# Patient Record
Sex: FEMALE | Race: WHITE | Employment: UNEMPLOYED | URBAN - NONMETROPOLITAN AREA
[De-identification: names, ages, dates, MRNs, and addresses within clinical notes are randomized per-mention and may not be internally consistent; named-entity substitution may affect disease eponyms.]

---

## 2019-01-15 LAB
AVERAGE GLUCOSE: NORMAL
CHOLESTEROL, TOTAL: 188 MG/DL
CHOLESTEROL/HDL RATIO: 4.8
HBA1C MFR BLD: 5.4 %
HDLC SERPL-MCNC: 39 MG/DL (ref 35–70)
LDL CHOLESTEROL CALCULATED: 134 MG/DL (ref 0–160)
TRIGL SERPL-MCNC: 74 MG/DL
VLDLC SERPL CALC-MCNC: ABNORMAL MG/DL

## 2019-02-05 LAB
CREATININE: 0.6 MG/DL
POTASSIUM (K+): 3.6

## 2019-02-07 ENCOUNTER — OFFICE VISIT (OUTPATIENT)
Dept: CARDIOLOGY CLINIC | Age: 59
End: 2019-02-07
Payer: MEDICARE

## 2019-02-07 VITALS
BODY MASS INDEX: 41.62 KG/M2 | WEIGHT: 243.8 LBS | HEIGHT: 64 IN | HEART RATE: 86 BPM | DIASTOLIC BLOOD PRESSURE: 82 MMHG | SYSTOLIC BLOOD PRESSURE: 152 MMHG

## 2019-02-07 DIAGNOSIS — R00.2 INTERMITTENT PALPITATIONS: ICD-10-CM

## 2019-02-07 DIAGNOSIS — I10 ESSENTIAL HYPERTENSION: Primary | ICD-10-CM

## 2019-02-07 PROCEDURE — G8484 FLU IMMUNIZE NO ADMIN: HCPCS | Performed by: INTERNAL MEDICINE

## 2019-02-07 PROCEDURE — G8417 CALC BMI ABV UP PARAM F/U: HCPCS | Performed by: INTERNAL MEDICINE

## 2019-02-07 PROCEDURE — G8427 DOCREV CUR MEDS BY ELIG CLIN: HCPCS | Performed by: INTERNAL MEDICINE

## 2019-02-07 PROCEDURE — 1036F TOBACCO NON-USER: CPT | Performed by: INTERNAL MEDICINE

## 2019-02-07 PROCEDURE — 3017F COLORECTAL CA SCREEN DOC REV: CPT | Performed by: INTERNAL MEDICINE

## 2019-02-07 PROCEDURE — 99204 OFFICE O/P NEW MOD 45 MIN: CPT | Performed by: INTERNAL MEDICINE

## 2019-02-07 RX ORDER — PHENOL 1.4 %
1 AEROSOL, SPRAY (ML) MUCOUS MEMBRANE DAILY
COMMUNITY
End: 2019-02-22

## 2019-02-07 RX ORDER — M-VIT,TX,IRON,MINS/CALC/FOLIC 27MG-0.4MG
1 TABLET ORAL DAILY
COMMUNITY

## 2019-02-07 RX ORDER — HYDRALAZINE HYDROCHLORIDE 25 MG/1
25 TABLET, FILM COATED ORAL 3 TIMES DAILY PRN
COMMUNITY
End: 2019-07-16 | Stop reason: SDUPTHER

## 2019-02-07 RX ORDER — AMLODIPINE BESYLATE 10 MG/1
10 TABLET ORAL DAILY
COMMUNITY
End: 2019-03-27 | Stop reason: SDUPTHER

## 2019-02-07 RX ORDER — ACETAMINOPHEN 325 MG/1
650 TABLET ORAL EVERY 6 HOURS PRN
COMMUNITY

## 2019-02-15 ENCOUNTER — HOSPITAL ENCOUNTER (OUTPATIENT)
Dept: NON INVASIVE DIAGNOSTICS | Age: 59
Discharge: HOME | End: 2019-02-15
Payer: MEDICARE

## 2019-02-15 DIAGNOSIS — R00.2 INTERMITTENT PALPITATIONS: ICD-10-CM

## 2019-02-15 DIAGNOSIS — I10 ESSENTIAL HYPERTENSION: ICD-10-CM

## 2019-02-15 LAB
LV EF: 60 %
LVEF MODALITY: NORMAL

## 2019-02-15 PROCEDURE — 93306 TTE W/DOPPLER COMPLETE: CPT

## 2019-02-15 PROCEDURE — 93225 XTRNL ECG REC<48 HRS REC: CPT

## 2019-02-15 PROCEDURE — 93226 XTRNL ECG REC<48 HR SCAN A/R: CPT

## 2019-02-22 ENCOUNTER — OFFICE VISIT (OUTPATIENT)
Dept: FAMILY MEDICINE CLINIC | Age: 59
End: 2019-02-22
Payer: MEDICARE

## 2019-02-22 ENCOUNTER — TELEPHONE (OUTPATIENT)
Dept: FAMILY MEDICINE CLINIC | Age: 59
End: 2019-02-22

## 2019-02-22 ENCOUNTER — HOSPITAL ENCOUNTER (OUTPATIENT)
Age: 59
Setting detail: SPECIMEN
Discharge: HOME | End: 2019-02-22
Payer: MEDICARE

## 2019-02-22 VITALS
HEART RATE: 84 BPM | DIASTOLIC BLOOD PRESSURE: 78 MMHG | TEMPERATURE: 97.4 F | WEIGHT: 246.6 LBS | BODY MASS INDEX: 42.1 KG/M2 | RESPIRATION RATE: 16 BRPM | HEIGHT: 64 IN | SYSTOLIC BLOOD PRESSURE: 138 MMHG

## 2019-02-22 DIAGNOSIS — R35.0 INCREASED URINARY FREQUENCY: ICD-10-CM

## 2019-02-22 DIAGNOSIS — R73.01 ELEVATED FASTING GLUCOSE: ICD-10-CM

## 2019-02-22 DIAGNOSIS — Z86.79 HISTORY OF INTRACRANIAL HEMORRHAGE: ICD-10-CM

## 2019-02-22 DIAGNOSIS — E78.2 MIXED HYPERLIPIDEMIA: ICD-10-CM

## 2019-02-22 DIAGNOSIS — R82.71 BACTERIA IN URINE: Primary | ICD-10-CM

## 2019-02-22 DIAGNOSIS — N30.00 ACUTE CYSTITIS WITHOUT HEMATURIA: ICD-10-CM

## 2019-02-22 DIAGNOSIS — I10 ESSENTIAL HYPERTENSION: Primary | ICD-10-CM

## 2019-02-22 PROBLEM — I61.9 ICH (INTRACEREBRAL HEMORRHAGE) (HCC): Status: ACTIVE | Noted: 2019-01-15

## 2019-02-22 PROBLEM — E66.01 SEVERE OBESITY (BMI >= 40) (HCC): Status: ACTIVE | Noted: 2019-02-22

## 2019-02-22 LAB
-: ABNORMAL
AMORPHOUS: ABNORMAL
BACTERIA: ABNORMAL
BILIRUBIN URINE: NEGATIVE
CASTS UA: ABNORMAL /LPF (ref 0–2)
COLOR: ABNORMAL
COMMENT UA: ABNORMAL
CRYSTALS, UA: ABNORMAL /HPF
EPITHELIAL CELLS UA: ABNORMAL /HPF (ref 0–5)
GLUCOSE URINE: NEGATIVE
KETONES, URINE: NEGATIVE
LEUKOCYTE ESTERASE, URINE: NEGATIVE
MUCUS: ABNORMAL
NITRITE, URINE: NEGATIVE
OTHER OBSERVATIONS UA: ABNORMAL
PH UA: 7.5 (ref 5–6)
PROTEIN UA: NEGATIVE
RBC UA: ABNORMAL /HPF (ref 0–4)
RENAL EPITHELIAL, UA: ABNORMAL /HPF
SPECIFIC GRAVITY UA: 1.01 (ref 1.01–1.02)
TRICHOMONAS: ABNORMAL
TURBIDITY: ABNORMAL
URINE HGB: NEGATIVE
UROBILINOGEN, URINE: NORMAL
WBC UA: ABNORMAL /HPF (ref 0–4)
YEAST: ABNORMAL

## 2019-02-22 PROCEDURE — 1036F TOBACCO NON-USER: CPT | Performed by: FAMILY MEDICINE

## 2019-02-22 PROCEDURE — 99204 OFFICE O/P NEW MOD 45 MIN: CPT | Performed by: FAMILY MEDICINE

## 2019-02-22 PROCEDURE — 81001 URINALYSIS AUTO W/SCOPE: CPT

## 2019-02-22 PROCEDURE — 3017F COLORECTAL CA SCREEN DOC REV: CPT | Performed by: FAMILY MEDICINE

## 2019-02-22 PROCEDURE — G8417 CALC BMI ABV UP PARAM F/U: HCPCS | Performed by: FAMILY MEDICINE

## 2019-02-22 PROCEDURE — G8427 DOCREV CUR MEDS BY ELIG CLIN: HCPCS | Performed by: FAMILY MEDICINE

## 2019-02-22 PROCEDURE — G8484 FLU IMMUNIZE NO ADMIN: HCPCS | Performed by: FAMILY MEDICINE

## 2019-02-22 PROCEDURE — 87086 URINE CULTURE/COLONY COUNT: CPT

## 2019-02-22 RX ORDER — HYDRALAZINE HYDROCHLORIDE 25 MG/1
25 TABLET, FILM COATED ORAL
COMMUNITY
End: 2019-02-22

## 2019-02-22 RX ORDER — NITROFURANTOIN 25; 75 MG/1; MG/1
100 CAPSULE ORAL 2 TIMES DAILY
Qty: 10 CAPSULE | Refills: 0 | Status: SHIPPED | OUTPATIENT
Start: 2019-02-22 | End: 2019-02-27

## 2019-02-22 RX ORDER — ERGOCALCIFEROL 1.25 MG/1
CAPSULE ORAL
Refills: 0 | COMMUNITY
Start: 2019-02-01 | End: 2019-05-28

## 2019-02-22 ASSESSMENT — PATIENT HEALTH QUESTIONNAIRE - PHQ9
SUM OF ALL RESPONSES TO PHQ9 QUESTIONS 1 & 2: 0
2. FEELING DOWN, DEPRESSED OR HOPELESS: 0
SUM OF ALL RESPONSES TO PHQ QUESTIONS 1-9: 0
SUM OF ALL RESPONSES TO PHQ QUESTIONS 1-9: 0
1. LITTLE INTEREST OR PLEASURE IN DOING THINGS: 0

## 2019-02-24 LAB
CULTURE: NORMAL
Lab: NORMAL
SPECIMEN DESCRIPTION: NORMAL

## 2019-02-25 DIAGNOSIS — Z86.79 HISTORY OF INTRACRANIAL HEMORRHAGE: Primary | ICD-10-CM

## 2019-02-27 ENCOUNTER — HOSPITAL ENCOUNTER (OUTPATIENT)
Dept: MRI IMAGING | Age: 59
Discharge: HOME | End: 2019-03-01
Payer: MEDICARE

## 2019-02-27 DIAGNOSIS — I69.152: ICD-10-CM

## 2019-02-27 DIAGNOSIS — N39.0 RECURRENT UTI: Primary | ICD-10-CM

## 2019-02-27 PROCEDURE — 6360000004 HC RX CONTRAST MEDICATION: Performed by: PSYCHIATRY & NEUROLOGY

## 2019-02-27 PROCEDURE — A9579 GAD-BASE MR CONTRAST NOS,1ML: HCPCS | Performed by: PSYCHIATRY & NEUROLOGY

## 2019-02-27 PROCEDURE — 70553 MRI BRAIN STEM W/O & W/DYE: CPT

## 2019-02-27 RX ADMIN — GADOTERIDOL 15 ML: 279.3 INJECTION, SOLUTION INTRAVENOUS at 14:15

## 2019-03-01 LAB
ACQUISITION DURATION: NORMAL S
AVERAGE HEART RATE: 91 BPM
HOOKUP DATE: NORMAL
HOOKUP TIME: NORMAL
MAX HEART RATE TIME/DATE: NORMAL
MAX HEART RATE: 138 BPM
MIN HEART RATE TIME/DATE: NORMAL
MIN HEART RATE: 65 BPM
NUMBER OF QRS COMPLEXES: NORMAL
NUMBER OF SUPRAVENTRICULAR BEATS IN RUNS: 0
NUMBER OF SUPRAVENTRICULAR COUPLETS: 0
NUMBER OF SUPRAVENTRICULAR ECTOPICS: 14
NUMBER OF SUPRAVENTRICULAR ISOLATED BEATS: 14
NUMBER OF SUPRAVENTRICULAR RUNS: 0
NUMBER OF VENTRICULAR BEATS IN RUNS: 0
NUMBER OF VENTRICULAR BIGEMINAL CYCLES: 0
NUMBER OF VENTRICULAR COUPLETS: 0
NUMBER OF VENTRICULAR ECTOPICS: 13
NUMBER OF VENTRICULAR ISOLATED BEATS: 13
NUMBER OF VENTRICULAR RUNS: 0

## 2019-03-18 ENCOUNTER — OFFICE VISIT (OUTPATIENT)
Dept: CARDIOLOGY CLINIC | Age: 59
End: 2019-03-18
Payer: MEDICARE

## 2019-03-18 VITALS
HEIGHT: 64 IN | SYSTOLIC BLOOD PRESSURE: 148 MMHG | DIASTOLIC BLOOD PRESSURE: 76 MMHG | BODY MASS INDEX: 42.33 KG/M2 | HEART RATE: 65 BPM

## 2019-03-18 DIAGNOSIS — E78.2 MIXED HYPERLIPIDEMIA: ICD-10-CM

## 2019-03-18 DIAGNOSIS — I10 ESSENTIAL HYPERTENSION: Primary | ICD-10-CM

## 2019-03-18 DIAGNOSIS — R00.2 INTERMITTENT PALPITATIONS: ICD-10-CM

## 2019-03-18 PROCEDURE — G8484 FLU IMMUNIZE NO ADMIN: HCPCS | Performed by: INTERNAL MEDICINE

## 2019-03-18 PROCEDURE — 99214 OFFICE O/P EST MOD 30 MIN: CPT | Performed by: INTERNAL MEDICINE

## 2019-03-18 PROCEDURE — G8427 DOCREV CUR MEDS BY ELIG CLIN: HCPCS | Performed by: INTERNAL MEDICINE

## 2019-03-18 PROCEDURE — 3017F COLORECTAL CA SCREEN DOC REV: CPT | Performed by: INTERNAL MEDICINE

## 2019-03-18 PROCEDURE — 1036F TOBACCO NON-USER: CPT | Performed by: INTERNAL MEDICINE

## 2019-03-18 PROCEDURE — G8417 CALC BMI ABV UP PARAM F/U: HCPCS | Performed by: INTERNAL MEDICINE

## 2019-03-26 ENCOUNTER — TELEPHONE (OUTPATIENT)
Dept: FAMILY MEDICINE CLINIC | Age: 59
End: 2019-03-26
Payer: MEDICARE

## 2019-03-26 DIAGNOSIS — I69.110 ATTENTION AND CONCENTRATION DEFICIT FOLLOWING NONTRAUMATIC INTRACEREBRAL HEMORRHAGE: ICD-10-CM

## 2019-03-26 DIAGNOSIS — M62.81 MUSCLE WEAKNESS (GENERALIZED): ICD-10-CM

## 2019-03-26 DIAGNOSIS — I69.111 MEMORY DEFICIT FOLLOWING NONTRAUMATIC INTRACEREBRAL HEMORRHAGE: ICD-10-CM

## 2019-03-26 DIAGNOSIS — I69.198 OTHER SEQUELAE OF NONTRAUMATIC INTRACEREBRAL HEMORRHAGE: Primary | ICD-10-CM

## 2019-03-26 PROCEDURE — G0180 MD CERTIFICATION HHA PATIENT: HCPCS | Performed by: FAMILY MEDICINE

## 2019-03-27 ENCOUNTER — HOSPITAL ENCOUNTER (OUTPATIENT)
Dept: CT IMAGING | Age: 59
Discharge: HOME | End: 2019-03-29
Payer: MEDICARE

## 2019-03-27 ENCOUNTER — HOSPITAL ENCOUNTER (OUTPATIENT)
Dept: LAB | Age: 59
Discharge: HOME | End: 2019-03-27
Payer: MEDICARE

## 2019-03-27 ENCOUNTER — OFFICE VISIT (OUTPATIENT)
Dept: FAMILY MEDICINE CLINIC | Age: 59
End: 2019-03-27
Payer: MEDICARE

## 2019-03-27 ENCOUNTER — OFFICE VISIT (OUTPATIENT)
Dept: UROLOGY | Age: 59
End: 2019-03-27
Payer: MEDICARE

## 2019-03-27 VITALS
SYSTOLIC BLOOD PRESSURE: 138 MMHG | HEIGHT: 64 IN | DIASTOLIC BLOOD PRESSURE: 82 MMHG | BODY MASS INDEX: 42.17 KG/M2 | WEIGHT: 247 LBS | HEART RATE: 84 BPM

## 2019-03-27 VITALS
HEIGHT: 64 IN | SYSTOLIC BLOOD PRESSURE: 138 MMHG | DIASTOLIC BLOOD PRESSURE: 82 MMHG | BODY MASS INDEX: 42.27 KG/M2 | HEART RATE: 84 BPM | WEIGHT: 247.6 LBS | RESPIRATION RATE: 16 BRPM

## 2019-03-27 DIAGNOSIS — R10.32 LLQ ABDOMINAL PAIN: ICD-10-CM

## 2019-03-27 DIAGNOSIS — I69.198 OTHER SEQUELAE OF NONTRAUMATIC INTRACEREBRAL HEMORRHAGE: ICD-10-CM

## 2019-03-27 DIAGNOSIS — N32.81 OVERACTIVE BLADDER: Primary | ICD-10-CM

## 2019-03-27 DIAGNOSIS — R10.32 LLQ ABDOMINAL PAIN: Primary | ICD-10-CM

## 2019-03-27 DIAGNOSIS — R10.32 LLQ PAIN: Primary | ICD-10-CM

## 2019-03-27 DIAGNOSIS — N39.0 RECURRENT UTI: ICD-10-CM

## 2019-03-27 DIAGNOSIS — R39.15 URGENCY OF URINATION: ICD-10-CM

## 2019-03-27 DIAGNOSIS — Z23 NEED FOR TDAP VACCINATION: ICD-10-CM

## 2019-03-27 DIAGNOSIS — I10 ESSENTIAL HYPERTENSION: ICD-10-CM

## 2019-03-27 DIAGNOSIS — R35.1 NOCTURIA: ICD-10-CM

## 2019-03-27 DIAGNOSIS — Z12.31 ENCOUNTER FOR SCREENING MAMMOGRAM FOR BREAST CANCER: ICD-10-CM

## 2019-03-27 DIAGNOSIS — R35.0 URINARY FREQUENCY: ICD-10-CM

## 2019-03-27 LAB
ABSOLUTE EOS #: 0.2 K/UL (ref 0–0.4)
ABSOLUTE IMMATURE GRANULOCYTE: NORMAL K/UL (ref 0–0.3)
ABSOLUTE LYMPH #: 1.2 K/UL (ref 1–4.8)
ABSOLUTE MONO #: 0.4 K/UL (ref 0.1–1.2)
ALBUMIN SERPL-MCNC: 4 G/DL (ref 3.5–5.2)
ALBUMIN/GLOBULIN RATIO: 1.7 (ref 1–2.5)
ALP BLD-CCNC: 68 U/L (ref 35–104)
ALT SERPL-CCNC: 13 U/L (ref 5–33)
ANION GAP SERPL CALCULATED.3IONS-SCNC: 9 MMOL/L (ref 9–17)
AST SERPL-CCNC: 12 U/L
BASOPHILS # BLD: 0 % (ref 0–1)
BASOPHILS ABSOLUTE: 0 K/UL (ref 0–0.2)
BILIRUB SERPL-MCNC: 0.37 MG/DL (ref 0.3–1.2)
BUN BLDV-MCNC: 18 MG/DL (ref 6–20)
BUN/CREAT BLD: 35 (ref 9–20)
CALCIUM SERPL-MCNC: 8.9 MG/DL (ref 8.6–10.4)
CHLORIDE BLD-SCNC: 104 MMOL/L (ref 98–107)
CO2: 29 MMOL/L (ref 20–31)
CREAT SERPL-MCNC: 0.51 MG/DL (ref 0.5–0.9)
DIFFERENTIAL TYPE: NORMAL
EOSINOPHILS RELATIVE PERCENT: 4 % (ref 1–7)
GFR AFRICAN AMERICAN: >60 ML/MIN
GFR NON-AFRICAN AMERICAN: >60 ML/MIN
GFR SERPL CREATININE-BSD FRML MDRD: ABNORMAL ML/MIN/{1.73_M2}
GFR SERPL CREATININE-BSD FRML MDRD: ABNORMAL ML/MIN/{1.73_M2}
GLUCOSE BLD-MCNC: 102 MG/DL (ref 70–99)
HCT VFR BLD CALC: 39.5 % (ref 36–46)
HEMOGLOBIN: 12.9 G/DL (ref 12–16)
IMMATURE GRANULOCYTES: NORMAL %
LYMPHOCYTES # BLD: 28 % (ref 16–46)
MCH RBC QN AUTO: 29.3 PG (ref 26–34)
MCHC RBC AUTO-ENTMCNC: 32.8 G/DL (ref 31–37)
MCV RBC AUTO: 89.5 FL (ref 80–100)
MONOCYTES # BLD: 10 % (ref 4–11)
NRBC AUTOMATED: NORMAL PER 100 WBC
PDW BLD-RTO: 13.8 % (ref 11–14.5)
PLATELET # BLD: 193 K/UL (ref 140–450)
PLATELET ESTIMATE: NORMAL
PMV BLD AUTO: 8.3 FL (ref 6–12)
POTASSIUM SERPL-SCNC: 3.8 MMOL/L (ref 3.7–5.3)
RBC # BLD: 4.41 M/UL (ref 4–5.2)
RBC # BLD: NORMAL 10*6/UL
SEG NEUTROPHILS: 58 % (ref 43–77)
SEGMENTED NEUTROPHILS ABSOLUTE COUNT: 2.5 K/UL (ref 1.8–7.7)
SODIUM BLD-SCNC: 142 MMOL/L (ref 135–144)
TOTAL PROTEIN: 6.4 G/DL (ref 6.4–8.3)
WBC # BLD: 4.4 K/UL (ref 3.5–11)
WBC # BLD: NORMAL 10*3/UL

## 2019-03-27 PROCEDURE — G8427 DOCREV CUR MEDS BY ELIG CLIN: HCPCS | Performed by: UROLOGY

## 2019-03-27 PROCEDURE — G8484 FLU IMMUNIZE NO ADMIN: HCPCS | Performed by: FAMILY MEDICINE

## 2019-03-27 PROCEDURE — 1036F TOBACCO NON-USER: CPT | Performed by: FAMILY MEDICINE

## 2019-03-27 PROCEDURE — 85025 COMPLETE CBC W/AUTO DIFF WBC: CPT

## 2019-03-27 PROCEDURE — 99204 OFFICE O/P NEW MOD 45 MIN: CPT | Performed by: UROLOGY

## 2019-03-27 PROCEDURE — G8484 FLU IMMUNIZE NO ADMIN: HCPCS | Performed by: UROLOGY

## 2019-03-27 PROCEDURE — 80053 COMPREHEN METABOLIC PANEL: CPT

## 2019-03-27 PROCEDURE — 74177 CT ABD & PELVIS W/CONTRAST: CPT

## 2019-03-27 PROCEDURE — 3017F COLORECTAL CA SCREEN DOC REV: CPT | Performed by: UROLOGY

## 2019-03-27 PROCEDURE — 6360000004 HC RX CONTRAST MEDICATION: Performed by: FAMILY MEDICINE

## 2019-03-27 PROCEDURE — G8427 DOCREV CUR MEDS BY ELIG CLIN: HCPCS | Performed by: FAMILY MEDICINE

## 2019-03-27 PROCEDURE — 99214 OFFICE O/P EST MOD 30 MIN: CPT | Performed by: FAMILY MEDICINE

## 2019-03-27 PROCEDURE — G8417 CALC BMI ABV UP PARAM F/U: HCPCS | Performed by: UROLOGY

## 2019-03-27 PROCEDURE — 36415 COLL VENOUS BLD VENIPUNCTURE: CPT

## 2019-03-27 PROCEDURE — G8417 CALC BMI ABV UP PARAM F/U: HCPCS | Performed by: FAMILY MEDICINE

## 2019-03-27 PROCEDURE — 1036F TOBACCO NON-USER: CPT | Performed by: UROLOGY

## 2019-03-27 PROCEDURE — 3017F COLORECTAL CA SCREEN DOC REV: CPT | Performed by: FAMILY MEDICINE

## 2019-03-27 RX ORDER — AMLODIPINE BESYLATE 10 MG/1
10 TABLET ORAL DAILY
Qty: 30 TABLET | Refills: 5 | Status: SHIPPED | OUTPATIENT
Start: 2019-03-27 | End: 2019-05-28 | Stop reason: SDUPTHER

## 2019-03-27 RX ORDER — OXYBUTYNIN CHLORIDE 10 MG/1
10 TABLET, EXTENDED RELEASE ORAL DAILY
Qty: 30 TABLET | Refills: 3 | Status: SHIPPED | OUTPATIENT
Start: 2019-03-27 | End: 2019-05-28

## 2019-03-27 RX ADMIN — IOHEXOL 50 ML: 240 INJECTION, SOLUTION INTRATHECAL; INTRAVASCULAR; INTRAVENOUS; ORAL at 14:55

## 2019-03-27 RX ADMIN — IOPAMIDOL 100 ML: 755 INJECTION, SOLUTION INTRAVENOUS at 14:55

## 2019-03-27 ASSESSMENT — PATIENT HEALTH QUESTIONNAIRE - PHQ9
SUM OF ALL RESPONSES TO PHQ9 QUESTIONS 1 & 2: 2
2. FEELING DOWN, DEPRESSED OR HOPELESS: 1
1. LITTLE INTEREST OR PLEASURE IN DOING THINGS: 1
SUM OF ALL RESPONSES TO PHQ QUESTIONS 1-9: 2
SUM OF ALL RESPONSES TO PHQ QUESTIONS 1-9: 2

## 2019-03-28 LAB
CHOLESTEROL, TOTAL: 152 MG/DL
CHOLESTEROL/HDL RATIO: 4.11
HDLC SERPL-MCNC: 37 MG/DL (ref 35–70)
LDL CHOLESTEROL CALCULATED: 94.8 MG/DL (ref 0–160)
TRIGL SERPL-MCNC: 101 MG/DL
VLDLC SERPL CALC-MCNC: NORMAL MG/DL

## 2019-04-02 ENCOUNTER — HOSPITAL ENCOUNTER (OUTPATIENT)
Age: 59
Setting detail: SPECIMEN
Discharge: HOME | End: 2019-04-02
Payer: MEDICARE

## 2019-04-02 PROCEDURE — 87506 IADNA-DNA/RNA PROBE TQ 6-11: CPT

## 2019-04-03 ENCOUNTER — TELEPHONE (OUTPATIENT)
Dept: FAMILY MEDICINE CLINIC | Age: 59
End: 2019-04-03

## 2019-04-03 DIAGNOSIS — R10.32 LLQ PAIN: ICD-10-CM

## 2019-04-03 DIAGNOSIS — E78.2 MIXED HYPERLIPIDEMIA: Primary | ICD-10-CM

## 2019-04-03 LAB
CAMPYLOBACTER PCR: NORMAL
E COLI ENTEROTOXIGENIC PCR: NORMAL
PLESIOMONAS SHIGELLOIDES PCR: NORMAL
SALMONELLA PCR: NORMAL
SHIGATOXIN GENE PCR: NORMAL
SHIGELLA SP PCR: NORMAL
SPECIMEN DESCRIPTION: NORMAL
VIBRIO PCR: NORMAL
YERSINIA ENTEROCOLITICA PCR: NORMAL

## 2019-04-03 RX ORDER — ATORVASTATIN CALCIUM 20 MG/1
20 TABLET, FILM COATED ORAL DAILY
Qty: 30 TABLET | Refills: 5 | Status: SHIPPED | OUTPATIENT
Start: 2019-04-03 | End: 2019-05-28

## 2019-04-04 ENCOUNTER — HOSPITAL ENCOUNTER (OUTPATIENT)
Dept: MAMMOGRAPHY | Age: 59
Discharge: HOME | End: 2019-04-06
Payer: MEDICARE

## 2019-04-04 DIAGNOSIS — Z12.31 ENCOUNTER FOR SCREENING MAMMOGRAM FOR BREAST CANCER: ICD-10-CM

## 2019-04-04 PROCEDURE — 77063 BREAST TOMOSYNTHESIS BI: CPT

## 2019-04-05 ENCOUNTER — TELEPHONE (OUTPATIENT)
Dept: FAMILY MEDICINE CLINIC | Age: 59
End: 2019-04-05

## 2019-04-10 NOTE — TELEPHONE ENCOUNTER
[facility] PT calling back stating pt's appt is Friday so they would really like this taken care of before Friday.

## 2019-04-11 NOTE — TELEPHONE ENCOUNTER
Writer spoke with [facility] PT,they request our office to contact dVisit to prior auth for out of network-PT,OT and speech. They will fax a form to complete. dVisit was [employer] with Pati THOMPSON # [id number].

## 2019-04-16 NOTE — TELEPHONE ENCOUNTER
Herve from the prior [facility] [employer] dept calling back stating she needs to clarify all things ordered with diagnosis codes even though we are completing a form, Mirela Pink states the form is coming from a different dept and we need to call and speak to prior [address] at [phone] ext [phone]

## 2019-04-17 NOTE — TELEPHONE ENCOUNTER
Writer spoke with [facility]. Patient's medical coverage is refusing to cover services at their facility. The patient is going to apply for financial assistance so she can receive therapy there.

## 2019-05-28 ENCOUNTER — OFFICE VISIT (OUTPATIENT)
Dept: FAMILY MEDICINE CLINIC | Age: 59
End: 2019-05-28
Payer: MEDICARE

## 2019-05-28 VITALS
RESPIRATION RATE: 16 BRPM | BODY MASS INDEX: 42.14 KG/M2 | DIASTOLIC BLOOD PRESSURE: 76 MMHG | WEIGHT: 246.8 LBS | HEIGHT: 64 IN | SYSTOLIC BLOOD PRESSURE: 138 MMHG | HEART RATE: 68 BPM

## 2019-05-28 DIAGNOSIS — L29.9 PRURITUS: ICD-10-CM

## 2019-05-28 DIAGNOSIS — M25.552 LEFT HIP PAIN: ICD-10-CM

## 2019-05-28 DIAGNOSIS — R60.0 LOWER EXTREMITY EDEMA: ICD-10-CM

## 2019-05-28 DIAGNOSIS — I69.198 OTHER SEQUELAE OF NONTRAUMATIC INTRACEREBRAL HEMORRHAGE: Primary | ICD-10-CM

## 2019-05-28 DIAGNOSIS — I10 ESSENTIAL HYPERTENSION: ICD-10-CM

## 2019-05-28 DIAGNOSIS — L98.9 SKIN LESION OF NECK: ICD-10-CM

## 2019-05-28 PROCEDURE — 3017F COLORECTAL CA SCREEN DOC REV: CPT | Performed by: FAMILY MEDICINE

## 2019-05-28 PROCEDURE — G8427 DOCREV CUR MEDS BY ELIG CLIN: HCPCS | Performed by: FAMILY MEDICINE

## 2019-05-28 PROCEDURE — G8417 CALC BMI ABV UP PARAM F/U: HCPCS | Performed by: FAMILY MEDICINE

## 2019-05-28 PROCEDURE — 99214 OFFICE O/P EST MOD 30 MIN: CPT | Performed by: FAMILY MEDICINE

## 2019-05-28 PROCEDURE — 1036F TOBACCO NON-USER: CPT | Performed by: FAMILY MEDICINE

## 2019-05-28 RX ORDER — METOPROLOL SUCCINATE 25 MG/1
25 TABLET, EXTENDED RELEASE ORAL DAILY
Qty: 30 TABLET | Refills: 3 | Status: SHIPPED | OUTPATIENT
Start: 2019-05-28 | End: 2019-05-28 | Stop reason: SDUPTHER

## 2019-05-28 RX ORDER — TRIAMCINOLONE ACETONIDE 5 MG/G
CREAM TOPICAL
Qty: 1 TUBE | Refills: 0 | Status: SHIPPED | OUTPATIENT
Start: 2019-05-28

## 2019-05-28 RX ORDER — AMLODIPINE BESYLATE 5 MG/1
5 TABLET ORAL DAILY
Qty: 90 TABLET | Refills: 0 | Status: SHIPPED | OUTPATIENT
Start: 2019-05-28 | End: 2019-06-05 | Stop reason: ALTCHOICE

## 2019-05-28 RX ORDER — METOPROLOL SUCCINATE 25 MG/1
25 TABLET, EXTENDED RELEASE ORAL DAILY
Qty: 90 TABLET | Refills: 0 | Status: SHIPPED | OUTPATIENT
Start: 2019-05-28 | End: 2019-07-16 | Stop reason: SDUPTHER

## 2019-05-28 RX ORDER — AMLODIPINE BESYLATE 10 MG/1
5 TABLET ORAL DAILY
Qty: 30 TABLET | Refills: 5 | Status: SHIPPED | OUTPATIENT
Start: 2019-05-28 | End: 2019-05-28 | Stop reason: SDUPTHER

## 2019-05-28 NOTE — PROGRESS NOTES
[address], [address]                        Telephone [phone]             Fax [phone]     Maxi Noel  1960  MRN:  [id number]  Date of visit:  5/28/2019    Subjective:    Maxi Noel is a 60 y.o.  female who presents to [facility] today (5/28/2019) for follow up/evaluation of: Other (patient wanting to start driving again-requesting to have referral for OT driving ability/  evaluation); Edema (bilateral lower leg/ankle,feet swelling started 4 days ago); Insect Bite (woke up with raised area on the left side of her jaw,itches); and Other (patient wanting to file for disability-discuss paperwork)      She has multiple concerns today. She had a hemorrhagic stroke in January 2019. She would like to begin driving again. She is also concerned about swelling in her feet and ankles bilaterally. She also has an raised area on the left side of her jaw. She thinks she may have been bitten by an insect while she was sleeping. She also reports left hip pain. She denies injury. She also reports generalized pruritus. She also would like disability paperwork completed.        She has the following problem list:  Patient Active Problem List   Diagnosis    Essential hypertension    Intermittent palpitations with tachycardia of max of 119 bpm  on exertion    History of intracranial hemorrhage    Family history of breast cancer    Mixed hyperlipidemia    Obstructive sleep apnea on CPAP    Severe obesity (BMI >= 40) (HCC)    Elevated fasting glucose        Current medications are:  Outpatient Medications Marked as Taking for the 5/28/19 encounter (Office Visit) with Teodoro Larson MD   Medication Sig Dispense Refill    metoprolol tartrate (LOPRESSOR) 12.5 mg TABS Take 12.5 mg by mouth 2 times daily      Cranberry 500 MG CHEW Take by mouth daily      amLODIPine (NORVASC) 10 MG tablet Take 1 tablet by mouth daily Indications: hold if bp is < 120/70 30 tablet 5    hydrALAZINE (APRESOLINE) 25 MG tablet Take 25 mg by mouth 3 times daily as needed Take if SBP> 140      aspirin 81 MG tablet Take 81 mg by mouth daily      Multiple Vitamins-Minerals (THERAPEUTIC MULTIVITAMIN-MINERALS) tablet Take 1 tablet by mouth daily      acetaminophen (TYLENOL) 325 MG tablet Take 650 mg by mouth every 6 hours as needed for Pain         She is allergic to latex; amoxicillin; ciprofloxacin; codeine; demerol hcl [meperidine]; docusate; metoprolol succinate [metoprolol]; norvasc [amlodipine]; omeprazole; prednisone; propofol; senna; sulfa antibiotics; tomato; ultram [tramadol hcl]; lisinopril; losartan; and z-waleska [azithromycin]. She is not currently a smoker. She  reports that she quit smoking about 34 years ago. Her smoking use included cigarettes. She started smoking about 42 years ago. She has never used smokeless tobacco.      Objective:    Vitals:    05/28/19 1211   BP: 138/76   Site: Right Upper Arm   Position: Sitting   Cuff Size: Large Adult   Pulse: 68   Resp: 16   Weight: 246 lb 12.8 oz (111.9 kg)   Height: 5' 4\" (1.626 m)     Body mass index is 42.36 kg/m². Extremely obese  female, healthy-appearing, alert, cooperative and in no distress. There is a mildly tender erythematous mass on the left side of the neck. Chest:  Normal expansion. Clear to auscultation. No rales, rhonchi, or wheezing. Heart sounds are normal.  Regular rate and rhythm without murmur, gallop or rub. Left hip has mild tenderness to palpation laterally. Lower extremities have trace to 1+ edema. She has had no recent labs. Assessment and Plan:    1.  Other sequelae of nontraumatic intracerebral hemorrhage  I have recommended occupational therapy evaluation to determine if she should be driving:  - External Referral To Occupational Therapy    I have asked her to sign a record release for her previous medical records prior to completing the disability paperwork. 2. Essential hypertension  Her blood pressure is adequately-controlled. (BP: 138/76)   She was advised to continue current medications. Toprol XL and Amlodipine were refilled:   - metoprolol succinate (TOPROL XL) 25 MG extended release tablet; Take 1 tablet by mouth daily  Dispense: 90 tablet; Refill: 0  - amLODIPine (NORVASC) 5 MG tablet; Take 1 tablet by mouth daily Indications: hold if bp is < 120/70  Dispense: 90 tablet; Refill: 0    3. Left hip pain  I have ordered x-rays to be done today:  - XR HIP LEFT (2-3 VIEWS); Future    She was also referred to orthopedic surgery:  - [facility] Orthopedic Surgery    4. Lower extremity edema  She had been seeing a cardiologist in [facility]. She prefers to see cardiology here. A referral was ordered:  - External Referral To Cardiology    5. Skin lesion of neck  Probable insect bite. Aristocort cream was prescribed:  - triamcinolone (ARISTOCORT) 0.5 % cream; Apply topically 3 times daily. Dispense: 1 Tube; Refill: 0    6. Pruritus  - [facility] Dermatology    7. Routine health maintenance  Health maintenance was reviewed with the patient. Colonoscopy was recommended. She declined. Hepatitis C and HIV screenings were recommended and declined. Tdap and Shingrix were recommended and declined. I inadvertently did not discuss cervical cancer screening with the patient at today's visit. I will discuss this with her at her next office visit. There is no indication for Pneumovax or Prevnar-13 at this time.      (Please note that portions of this note were completed with a voice-recognition program. Efforts were made to edit the dictation but occasionally words are mis-transcribed.)

## 2019-06-03 ENCOUNTER — TELEPHONE (OUTPATIENT)
Dept: FAMILY MEDICINE CLINIC | Age: 59
End: 2019-06-03

## 2019-06-03 NOTE — TELEPHONE ENCOUNTER
Pt calling stating that OT has released her to get her license back and pt questions what she is to do next in this process, please advise at above number. Pt aware RR out of office today.

## 2019-06-05 ENCOUNTER — OFFICE VISIT (OUTPATIENT)
Dept: CARDIOLOGY CLINIC | Age: 59
End: 2019-06-05
Payer: MEDICARE

## 2019-06-05 VITALS
HEIGHT: 64 IN | DIASTOLIC BLOOD PRESSURE: 90 MMHG | WEIGHT: 243 LBS | BODY MASS INDEX: 41.48 KG/M2 | SYSTOLIC BLOOD PRESSURE: 152 MMHG | HEART RATE: 70 BPM

## 2019-06-05 DIAGNOSIS — I10 ESSENTIAL HYPERTENSION: Primary | ICD-10-CM

## 2019-06-05 DIAGNOSIS — R06.09 DOE (DYSPNEA ON EXERTION): ICD-10-CM

## 2019-06-05 DIAGNOSIS — E78.2 MIXED HYPERLIPIDEMIA: ICD-10-CM

## 2019-06-05 PROCEDURE — 3017F COLORECTAL CA SCREEN DOC REV: CPT | Performed by: PHYSICIAN ASSISTANT

## 2019-06-05 PROCEDURE — G8427 DOCREV CUR MEDS BY ELIG CLIN: HCPCS | Performed by: PHYSICIAN ASSISTANT

## 2019-06-05 PROCEDURE — 93000 ELECTROCARDIOGRAM COMPLETE: CPT | Performed by: PHYSICIAN ASSISTANT

## 2019-06-05 PROCEDURE — 1036F TOBACCO NON-USER: CPT | Performed by: PHYSICIAN ASSISTANT

## 2019-06-05 PROCEDURE — G8417 CALC BMI ABV UP PARAM F/U: HCPCS | Performed by: PHYSICIAN ASSISTANT

## 2019-06-05 PROCEDURE — 99203 OFFICE O/P NEW LOW 30 MIN: CPT | Performed by: PHYSICIAN ASSISTANT

## 2019-06-05 RX ORDER — HYDROCHLOROTHIAZIDE 12.5 MG/1
12.5 TABLET ORAL DAILY
Qty: 90 TABLET | Refills: 3 | Status: SHIPPED
Start: 2019-06-05 | End: 2019-07-16 | Stop reason: SINTOL

## 2019-06-05 RX ORDER — AMLODIPINE BESYLATE 5 MG/1
5 TABLET ORAL DAILY
COMMUNITY
End: 2019-07-16 | Stop reason: SDUPTHER

## 2019-06-05 RX ORDER — HYDROCODONE BITARTRATE AND ACETAMINOPHEN 5; 325 MG/1; MG/1
1 TABLET ORAL
COMMUNITY
Start: 2017-11-15

## 2019-06-05 NOTE — PROGRESS NOTES
[facility] PROFESSIONAL SERVICES  [facility] [address]   [address] [address]   Dept: [phone]   Dept Fax: [phone]   Loc: [phone]      Chief Complaint   Patient presents with   Elverna Whitley    Hypertension     Patient with complaint of increased lower extremity edema and shortness of breath. Patient recently had an echo which revealed a preserved LV function, grade 1 diastolic dysfunction. She had a Holter monitor which did not reveal any significant arrhythmias. She has a strong family history with both parents undergoing PCI with her mother having it done in her 46s. Patient has hypertension and hyperlipidemia. Blood pressure still runs high at times. Her Norvasc was decreased from 10-5 mg due to the fact that there was some concerns that may be causing the edema.         General:   No fever, no chills, No fatigue or weight loss  Pulmonary:   Dyspnea on exertion  Cardiac:    Denies recent chest pain   GI:     No nausea or vomiting, no abdominal pain  Neuro:    No dizziness or light headedness  Musculoskeletal:  No recent active issues  Extremities:   +edema, good peripheral pulses      Past Medical History:   Diagnosis Date    Arrhythmia     Chronic pain     Edema     GERD (gastroesophageal reflux disease)     Hypertension     [facility]brum[facility] ([facility] 2019    Urticaria        Allergies   Allergen Reactions    Latex Rash    Amoxicillin     Ciprofloxacin     Codeine     Demerol Hcl [Meperidine]     Docusate Hives    Metoprolol Succinate [Metoprolol]     Norvasc [Amlodipine]     Omeprazole     Prednisone     Propofol     Senna     Sulfa Antibiotics     Tomato     Ultram [Tramadol Hcl]     Lisinopril Rash    Losartan Rash    Z-Randall [Azithromycin] Anxiety       Current Outpatient Medications   Medication Sig Dispense Refill    NONFORMULARY Apple cider vinegar 1-2 tabs daily      HYDROcodone-acetaminophen (NORCO) 5-325 MG per tablet Relationship status: None    Intimate partner violence:     Fear of current or ex partner: None     Emotionally abused: None     Physically abused: None     Forced sexual activity: None   Other Topics Concern    None   Social History Narrative    None       Family History   Problem Relation Age of Onset    High Blood Pressure Mother     Arthritis Sister     Cancer Brother         colon cancer    Other Daughter         bipolar disorder       Blood pressure (!) 152/90, pulse 70, height 5' 4\" (1.626 m), weight 243 lb (110.2 kg). General:   Well developed, well nourished  Lungs:   Clear to auscultation  Heart:    Normal S1 S2, No murmur, rubs, or gallops  Abdomen:   Soft, non tender, no organomegalies, positive bowel sounds  Extremities:   No edema, no cyanosis, good peripheral pulses  Neurological:   Awake, alert, oriented. No obvious focal deficits  Musculoskeletal:  No obvious deformities    EKG:  Normal sinus rhythm    Holter monitor 2/15/2019  CONCLUSION:  *    Normal Sinus rhythm   No long pause or profound bradycardia   No supraventricular tachycardia   No Atrial Fibrillation    Echo 2/15/2019  Conclusions      Summary   Normal left ventricle size and systolic function. Ejection fraction was   estimated at 60 %. There were no regional left ventricular wall motion   abnormalities and wall thickness was within normal limits.   Doppler parameters were consistent with abnormal left ventricular   relaxation (grade 1 diastolic dysfunction).   The left atrium is Mildly dilated. Diagnosis Orders   1. Essential hypertension  EKG 12 Lead    Stress test (Lexiscan)   2. CHAPMAN (dyspnea on exertion)  Stress test (Lexiscan)   3.  Mixed hyperlipidemia         Orders Placed This Encounter   Procedures    Stress test Zayra Reilly)     Standing Status:   Future     Standing Expiration Date:   6/5/2020     Order Specific Question:   Reason for Exam?     Answer:   Shortness of breath     Order Specific Question:   Does patient have left bundle branch block (LBBB) or left ventricular hypertrophy (LVH) based on resting ECG, a ventricular pacemaker, or is unable to exercise on a treadmill based on physical or mental limitations? Answer: Yes    EKG 12 Lead     Order Specific Question:   Reason for Exam?     Answer: Other         I will add hydrochlorothiazide 12.5 mg daily. Would like her to monitor her blood pressure closely. 2 g sodium diet and 2 L fluid restriction. We will do a Lexiscan stress test.    Continue all other current medications and with constant vigilance to changes in symptoms and also any potential side effects. Return for care if become worse or seek medical attention immediately. The patient is educated on symptoms of heart disease that include chest pain and passing out, dizziness, etc and to report them if there is any change or go to emergency room.        Follow up with Myself in 6-8 weeks or sooner if needed  (Please note that portions of this note were completed with a voice recognition program.  Efforts were made to edit the dictation but occasionally words are mis-transcribed.)      Yesenia Cooper PA-C

## 2019-06-07 ENCOUNTER — TELEPHONE (OUTPATIENT)
Dept: PRIMARY CARE CLINIC | Age: 59
End: 2019-06-07

## 2019-06-07 NOTE — TELEPHONE ENCOUNTER
Pt's daughter Crystal Adjutant called [phone]. Dr. Jessa Wheat pt-    Pt has hx of recent CVA. Amlodipine was decreased to 5 mg daily from 10 mg due to edema and HCTZ was added. She has hydralazine 25 mg tid prn which she has been taking more often. Took one at 3 PM.  BP at 4 PM is still 144/105. Pt's daughter wants her to go back to amlodipine 10 mg daily due to that is what she had been taking for a long time.

## 2019-06-07 NOTE — TELEPHONE ENCOUNTER
I spoke to Mehul. She is aware of Kim's recommendation and is unsure when she can bring her mom, who can't drive. They will try to bring her tonight or tomorrow. She knows the hydralazine says tidoreen prn, but last time they gave her three in a day, BP \"bottomed out. \"

## 2019-06-27 ENCOUNTER — HOSPITAL ENCOUNTER (OUTPATIENT)
Dept: NON INVASIVE DIAGNOSTICS | Age: 59
Discharge: HOME | End: 2019-06-27
Payer: MEDICARE

## 2019-06-27 VITALS — HEIGHT: 64 IN | BODY MASS INDEX: 40.97 KG/M2 | WEIGHT: 240 LBS

## 2019-06-27 DIAGNOSIS — I10 ESSENTIAL HYPERTENSION: ICD-10-CM

## 2019-06-27 DIAGNOSIS — R06.09 DOE (DYSPNEA ON EXERTION): ICD-10-CM

## 2019-06-27 PROCEDURE — 2709999900 HC NON-CHARGEABLE SUPPLY

## 2019-06-27 PROCEDURE — A9500 TC99M SESTAMIBI: HCPCS | Performed by: INTERNAL MEDICINE

## 2019-06-27 PROCEDURE — 3430000000 HC RX DIAGNOSTIC RADIOPHARMACEUTICAL: Performed by: INTERNAL MEDICINE

## 2019-06-27 PROCEDURE — 93017 CV STRESS TEST TRACING ONLY: CPT

## 2019-06-27 PROCEDURE — 6360000002 HC RX W HCPCS

## 2019-06-27 PROCEDURE — 78452 HT MUSCLE IMAGE SPECT MULT: CPT

## 2019-06-27 PROCEDURE — 93016 CV STRESS TEST SUPVJ ONLY: CPT | Performed by: INTERNAL MEDICINE

## 2019-06-27 PROCEDURE — 93018 CV STRESS TEST I&R ONLY: CPT | Performed by: INTERNAL MEDICINE

## 2019-06-27 PROCEDURE — 78452 HT MUSCLE IMAGE SPECT MULT: CPT | Performed by: INTERNAL MEDICINE

## 2019-06-27 RX ADMIN — Medication 34.9 MILLICURIE: at 12:20

## 2019-06-27 RX ADMIN — Medication 10.1 MILLICURIE: at 11:16

## 2019-06-28 ENCOUNTER — TELEPHONE (OUTPATIENT)
Dept: FAMILY MEDICINE CLINIC | Age: 59
End: 2019-06-28

## 2019-06-28 NOTE — TELEPHONE ENCOUNTER
Patient called in, was very confusing, stating I needed to tell her right now what is going on with her Drivers License. I informed her I did not know what she ment, she states that she needs a letter stating she can drive.

## 2019-06-28 NOTE — TELEPHONE ENCOUNTER
Spoke to patient and patient states therapy told her they sent driving recommendation paperwork to our office. No paperwork was received. Call made to [facility] therapy to refax paper.

## 2019-07-16 ENCOUNTER — OFFICE VISIT (OUTPATIENT)
Dept: CARDIOLOGY CLINIC | Age: 59
End: 2019-07-16
Payer: MEDICARE

## 2019-07-16 VITALS
DIASTOLIC BLOOD PRESSURE: 98 MMHG | WEIGHT: 242 LBS | HEIGHT: 64 IN | SYSTOLIC BLOOD PRESSURE: 174 MMHG | BODY MASS INDEX: 41.32 KG/M2 | HEART RATE: 83 BPM

## 2019-07-16 DIAGNOSIS — E66.3 OVER WEIGHT: Primary | ICD-10-CM

## 2019-07-16 DIAGNOSIS — I10 ESSENTIAL HYPERTENSION: ICD-10-CM

## 2019-07-16 PROCEDURE — G8427 DOCREV CUR MEDS BY ELIG CLIN: HCPCS | Performed by: PHYSICIAN ASSISTANT

## 2019-07-16 PROCEDURE — 1036F TOBACCO NON-USER: CPT | Performed by: PHYSICIAN ASSISTANT

## 2019-07-16 PROCEDURE — 3017F COLORECTAL CA SCREEN DOC REV: CPT | Performed by: PHYSICIAN ASSISTANT

## 2019-07-16 PROCEDURE — G8417 CALC BMI ABV UP PARAM F/U: HCPCS | Performed by: PHYSICIAN ASSISTANT

## 2019-07-16 PROCEDURE — 99213 OFFICE O/P EST LOW 20 MIN: CPT | Performed by: PHYSICIAN ASSISTANT

## 2019-07-16 RX ORDER — AMLODIPINE BESYLATE 5 MG/1
5 TABLET ORAL DAILY
Qty: 90 TABLET | Refills: 1 | Status: CANCELLED | OUTPATIENT
Start: 2019-07-16

## 2019-07-16 RX ORDER — METOPROLOL SUCCINATE 25 MG/1
25 TABLET, EXTENDED RELEASE ORAL DAILY
Qty: 90 TABLET | Refills: 0 | Status: CANCELLED | OUTPATIENT
Start: 2019-07-16

## 2019-07-16 RX ORDER — HYDRALAZINE HYDROCHLORIDE 25 MG/1
25 TABLET, FILM COATED ORAL 3 TIMES DAILY PRN
Qty: 90 TABLET | Refills: 1 | Status: CANCELLED | OUTPATIENT
Start: 2019-07-16

## 2019-07-16 RX ORDER — AMLODIPINE BESYLATE 5 MG/1
5 TABLET ORAL DAILY
Qty: 90 TABLET | Refills: 1 | Status: SHIPPED | OUTPATIENT
Start: 2019-07-16

## 2019-07-16 RX ORDER — METOPROLOL SUCCINATE 25 MG/1
25 TABLET, EXTENDED RELEASE ORAL DAILY
Qty: 90 TABLET | Refills: 1 | Status: SHIPPED | OUTPATIENT
Start: 2019-07-16 | End: 2019-08-22

## 2019-07-16 RX ORDER — HYDRALAZINE HYDROCHLORIDE 25 MG/1
25 TABLET, FILM COATED ORAL 3 TIMES DAILY PRN
Qty: 90 TABLET | Refills: 1 | Status: SHIPPED | OUTPATIENT
Start: 2019-07-16

## 2019-08-12 ENCOUNTER — TELEPHONE (OUTPATIENT)
Dept: INTERNAL MEDICINE CLINIC | Age: 59
End: 2019-08-12

## 2019-08-14 ENCOUNTER — TELEPHONE (OUTPATIENT)
Dept: INTERNAL MEDICINE CLINIC | Age: 59
End: 2019-08-14

## 2019-08-22 ENCOUNTER — TELEPHONE (OUTPATIENT)
Dept: CARDIOLOGY CLINIC | Age: 59
End: 2019-08-22

## 2019-08-22 NOTE — TELEPHONE ENCOUNTER
Stop Toprol-XL. We can increase her hydralazine to 50 mg 3 times a day as needed for control of her blood pressure.

## 2019-08-28 ENCOUNTER — TELEPHONE (OUTPATIENT)
Dept: CARDIOLOGY CLINIC | Age: 59
End: 2019-08-28

## 2019-09-10 DIAGNOSIS — I10 ESSENTIAL HYPERTENSION: ICD-10-CM

## 2019-09-10 RX ORDER — AMLODIPINE BESYLATE 5 MG/1
TABLET ORAL
Qty: 90 TABLET | Refills: 0 | OUTPATIENT
Start: 2019-09-10

## 2020-01-07 ENCOUNTER — HOSPITAL ENCOUNTER (OUTPATIENT)
Age: 60
Setting detail: SPECIMEN
Discharge: HOME | End: 2020-01-07
Payer: MEDICARE

## 2020-01-10 LAB
HPV SAMPLE: NORMAL
HPV, GENOTYPE 16: NOT DETECTED
HPV, GENOTYPE 18: NOT DETECTED
HPV, HIGH RISK OTHER: NOT DETECTED
HPV, INTERPRETATION: NORMAL
SPECIMEN DESCRIPTION: NORMAL

## 2020-01-13 LAB — CYTOLOGY REPORT: NORMAL

## 2020-01-16 ENCOUNTER — HOSPITAL ENCOUNTER (OUTPATIENT)
Age: 60
Setting detail: SPECIMEN
Discharge: HOME | End: 2020-01-16
Payer: MEDICARE

## 2020-01-16 LAB
ABSOLUTE EOS #: 0.25 K/UL (ref 0–0.44)
ABSOLUTE IMMATURE GRANULOCYTE: <0.03 K/UL (ref 0–0.3)
ABSOLUTE LYMPH #: 1.62 K/UL (ref 1.1–3.7)
ABSOLUTE MONO #: 0.42 K/UL (ref 0.1–1.2)
ALBUMIN SERPL-MCNC: 4.4 G/DL (ref 3.5–5.2)
ALBUMIN/GLOBULIN RATIO: 1.5 (ref 1–2.5)
ALP BLD-CCNC: 77 U/L (ref 35–104)
ALT SERPL-CCNC: 19 U/L (ref 5–33)
ANION GAP SERPL CALCULATED.3IONS-SCNC: 13 MMOL/L (ref 9–17)
AST SERPL-CCNC: 17 U/L
BASOPHILS # BLD: 1 % (ref 0–2)
BASOPHILS ABSOLUTE: 0.03 K/UL (ref 0–0.2)
BILIRUB SERPL-MCNC: 0.46 MG/DL (ref 0.3–1.2)
BUN BLDV-MCNC: 8 MG/DL (ref 8–23)
BUN/CREAT BLD: ABNORMAL (ref 9–20)
CALCIUM SERPL-MCNC: 9.3 MG/DL (ref 8.6–10.4)
CHLORIDE BLD-SCNC: 103 MMOL/L (ref 98–107)
CHOLESTEROL/HDL RATIO: 3.7
CHOLESTEROL: 187 MG/DL
CO2: 27 MMOL/L (ref 20–31)
CREAT SERPL-MCNC: 0.61 MG/DL (ref 0.5–0.9)
DIFFERENTIAL TYPE: NORMAL
EOSINOPHILS RELATIVE PERCENT: 4 % (ref 1–4)
GFR AFRICAN AMERICAN: >60 ML/MIN
GFR NON-AFRICAN AMERICAN: >60 ML/MIN
GFR SERPL CREATININE-BSD FRML MDRD: ABNORMAL ML/MIN/{1.73_M2}
GFR SERPL CREATININE-BSD FRML MDRD: ABNORMAL ML/MIN/{1.73_M2}
GLUCOSE BLD-MCNC: 100 MG/DL (ref 70–99)
HCT VFR BLD CALC: 44.6 % (ref 36.3–47.1)
HDLC SERPL-MCNC: 50 MG/DL
HEMOGLOBIN: 13.9 G/DL (ref 11.9–15.1)
IMMATURE GRANULOCYTES: 0 %
LDL CHOLESTEROL: 115 MG/DL (ref 0–130)
LYMPHOCYTES # BLD: 28 % (ref 24–43)
MCH RBC QN AUTO: 28.3 PG (ref 25.2–33.5)
MCHC RBC AUTO-ENTMCNC: 31.2 G/DL (ref 28.4–34.8)
MCV RBC AUTO: 90.8 FL (ref 82.6–102.9)
MONOCYTES # BLD: 7 % (ref 3–12)
NRBC AUTOMATED: 0 PER 100 WBC
PDW BLD-RTO: 13.2 % (ref 11.8–14.4)
PLATELET # BLD: 273 K/UL (ref 138–453)
PLATELET ESTIMATE: NORMAL
PMV BLD AUTO: 10.7 FL (ref 8.1–13.5)
POTASSIUM SERPL-SCNC: 4 MMOL/L (ref 3.7–5.3)
RBC # BLD: 4.91 M/UL (ref 3.95–5.11)
RBC # BLD: NORMAL 10*6/UL
SEG NEUTROPHILS: 60 % (ref 36–65)
SEGMENTED NEUTROPHILS ABSOLUTE COUNT: 3.43 K/UL (ref 1.5–8.1)
SODIUM BLD-SCNC: 143 MMOL/L (ref 135–144)
TOTAL PROTEIN: 7.3 G/DL (ref 6.4–8.3)
TRIGL SERPL-MCNC: 110 MG/DL
TSH SERPL DL<=0.05 MIU/L-ACNC: 1.9 MIU/L (ref 0.3–5)
VLDLC SERPL CALC-MCNC: NORMAL MG/DL (ref 1–30)
WBC # BLD: 5.8 K/UL (ref 3.5–11.3)
WBC # BLD: NORMAL 10*3/UL

## 2020-01-20 ENCOUNTER — TELEPHONE (OUTPATIENT)
Dept: CARDIOLOGY CLINIC | Age: 60
End: 2020-01-20

## 2023-03-08 ENCOUNTER — OUTSIDE SERVICES (OUTPATIENT)
Dept: FAMILY MEDICINE CLINIC | Age: 63
End: 2023-03-08

## 2023-03-12 VITALS
TEMPERATURE: 97.8 F | OXYGEN SATURATION: 94 % | RESPIRATION RATE: 18 BRPM | WEIGHT: 261.6 LBS | DIASTOLIC BLOOD PRESSURE: 74 MMHG | HEART RATE: 82 BPM | BODY MASS INDEX: 44.9 KG/M2 | SYSTOLIC BLOOD PRESSURE: 126 MMHG

## 2023-03-12 ASSESSMENT — ENCOUNTER SYMPTOMS
WHEEZING: 0
CONSTIPATION: 0
SHORTNESS OF BREATH: 0
COUGH: 0
DIARRHEA: 0
EYE REDNESS: 0
RHINORRHEA: 0
NAUSEA: 0
EYE DISCHARGE: 0
VOMITING: 0

## 2023-03-12 NOTE — PROGRESS NOTES
Araseli Martinez is a 60 y.o. female who presents today for her medical conditions/complaints as noted below. Chief Complaint   Patient presents with    Other     Admission to [facility] [address]       HPI:     Ney Sommers \"Luisa\" Angel Cox is a 61 yo female who was seen today for her admission to [facility] [address] (admitted 23). She was admitted to The [address] on 23 for right-sided weakness and dysarthria secondary to an acute left pontine infarct with right hemiparesis. She remained hospitalized there until 23 when she was transferred to inpatient rehab where she remained until 23. She is here for continued rehab. Her medical history includes an intracranial hemorrhage in 2019, HTN, HLD, TROY, and obesity. She is hoping to return home alone upon discharge. She believes therapy is going well. She denies any acute issues today. Her appetite has been good and her bowels are moving fine.       Past Medical History:   Diagnosis Date    Arrhythmia     Chronic pain     Edema     GERD (gastroesophageal reflux disease)     Hypertension     Stroke (cerebrum) ([facility] [address] 2019    Urticaria       Past Surgical History:   Procedure Laterality Date    CARPAL TUNNEL RELEASE Bilateral      SECTION      x 3    CHOLECYSTECTOMY      COLONOSCOPY      COLONOSCOPY  2018    [place]    UPPER GASTROINTESTINAL ENDOSCOPY         Family History   Problem Relation Age of Onset    High Blood Pressure Mother     Arthritis Sister     Cancer Brother         colon cancer    Other Daughter         bipolar disorder       Social History     Tobacco Use    Smoking status: Former     Types: Cigarettes     Start date:      Quit date:      Years since quittin.2    Smokeless tobacco: Never   Substance Use Topics    Alcohol use: No     Alcohol/week: 0.0 standard drinks      Allergies   Allergen Reactions    Latex Rash    Amoxicillin     Ciprofloxacin     Codeine     Demerol Hcl [Meperidine] Docusate Hives    Metoprolol Succinate [Metoprolol]     Norvasc [Amlodipine]     Omeprazole     Prednisone     Propofol     Senna     Sulfa Antibiotics     Tomato     Ultram [Tramadol Hcl]     Iv [Iodides] Rash     Pt states got hot spots with rash. Lisinopril Rash    Losartan Rash    Z-Randall [Azithromycin] Anxiety       Health Maintenance   Topic Date Due    COVID-19 Vaccine (1) Never done    Depression Screen  Never done    HIV screen  Never done    Hepatitis C screen  Never done    Colorectal Cancer Screen  Never done    Shingles vaccine (2 of 3) 10/24/2009    DTaP/Tdap/Td vaccine (2 - Td or Tdap) 09/16/2018    A1C test (Diabetic or Prediabetic)  01/15/2020    Breast cancer screen  04/04/2021    Flu vaccine (1) Never done    Cervical cancer screen  01/07/2025    Lipids  01/25/2028    Hepatitis A vaccine  Aged Out    Hib vaccine  Aged Out    Meningococcal (ACWY) vaccine  Aged Out    Pneumococcal 0-64 years Vaccine  Aged Out       Subjective:      Review of Systems   Constitutional:  Positive for fatigue. Negative for fever and unexpected weight change. HENT:  Negative for congestion and rhinorrhea. Eyes:  Negative for discharge and redness. Respiratory:  Negative for cough, shortness of breath and wheezing. Cardiovascular:  Negative for chest pain and leg swelling. Gastrointestinal:  Negative for constipation, diarrhea, nausea and vomiting. Endocrine: Negative for polydipsia and polyuria. Genitourinary:  Negative for dysuria and hematuria. Musculoskeletal:  Positive for arthralgias and gait problem. Negative for myalgias. Skin:  Negative for rash and wound. Neurological:  Positive for speech difficulty and weakness. Negative for dizziness, light-headedness and headaches. Psychiatric/Behavioral:  Negative for dysphoric mood. Objective:     Physical Exam  Vitals and nursing note reviewed. Constitutional:       General: She is not in acute distress.      Appearance: She is well-developed. She is obese. HENT:      Head: Normocephalic and atraumatic. Right Ear: External ear normal.      Left Ear: External ear normal.      Nose: Nose normal.   Eyes:      General: No scleral icterus. Right eye: No discharge. Left eye: No discharge. Conjunctiva/sclera: Conjunctivae normal.   Neck:      Vascular: No JVD. Cardiovascular:      Rate and Rhythm: Normal rate and regular rhythm. Heart sounds: Normal heart sounds. Pulmonary:      Effort: Pulmonary effort is normal. No respiratory distress. Breath sounds: Normal breath sounds. No stridor. No wheezing or rales. Abdominal:      General: Bowel sounds are normal. There is no distension. Palpations: Abdomen is soft. Tenderness: There is no abdominal tenderness. Musculoskeletal:         General: No deformity. Normal range of motion. Cervical back: Neck supple. Comments: AFO LLE. Skin:     General: Skin is warm and dry. Findings: No erythema or rash. Neurological:      Mental Status: She is alert and oriented to person, place, and time. Cranial Nerves: Cranial nerve deficit present. Motor: Weakness, atrophy and abnormal muscle tone present. Psychiatric:         Mood and Affect: Affect is labile. Speech: Speech is delayed. Behavior: Behavior normal.     /74   Pulse 82   Temp 97.8 °F (36.6 °C)   Resp 18   Wt 261 lb 9.6 oz (118.7 kg)   SpO2 94%   BMI 44.90 kg/m²     Assessment/Plan      1. Left sided lacunar infarction [facility]) - Follow up with neurology as directed. Continue Cardizem, Hydralazine, ASA, Plavix, and Lipitor. Continue with PT/OT/speech. 2. Mixed hyperlipidemia - Continue on ASA, Plavix, statin, and a healthy diet. 3. TROY (obstructive sleep apnea) - She does not have a CPAP so continue on oxygen nightly. 4. Primary hypertension - Her blood pressure has been controlled so continue on Cardizem and Hydralazine.    5. Other osteoarthritis involving multiple joints - Continue Voltaren, Tylenol, and Zanaflex for severe OA of her right knee. 6. Severe obesity (BMI >= 40) ([facility]) - Continue with therapy and a healthy diet. Her weight is down 8 lbs since admission. Resident has CVA, h/o intracranial hemorrhage, HTN, HLD, OA, TROY, and obesity and it is expected to last 12 or more months. These chronic conditions place resident at a significant risk of death, acute exacerbation, decline in function or functional decline. His comprehensive plan was established, implemented, revised or monitored today. I spent 20 minutes reviewing plan. Patient seen and examined. History partially obtained by chart review and nursing notes. I have reviewed patient's past medical, surgical, social, and family history and have made updates where appropriate. See facility EMR for updated medication list.      More than 50% of the total visit time must involve counseling/coordination of care. The total visit time encompasses both the face-to-face time spent with the patient at the bedside and the additional time spent on the unit/floor reviewing data, obtaining relevant patient information, and discussing the case with other involved healthcare providers. On this date 3/1/23 I have spent 48 minutes reviewing previous notes, test results and face to face with the patient discussing the diagnosis and importance of compliance with the treatment plan as well as documenting on the day of the visit.      Electronically signed by Sanya Wayne MD on 3/15/2023 at 3:35 PM

## 2023-03-15 ENCOUNTER — OUTSIDE SERVICES (OUTPATIENT)
Dept: FAMILY MEDICINE CLINIC | Age: 63
End: 2023-03-15

## 2023-03-15 DIAGNOSIS — I10 PRIMARY HYPERTENSION: ICD-10-CM

## 2023-03-15 DIAGNOSIS — E66.01 SEVERE OBESITY (BMI >= 40) (HCC): ICD-10-CM

## 2023-03-15 DIAGNOSIS — I63.81 LEFT SIDED LACUNAR INFARCTION (HCC): Primary | ICD-10-CM

## 2023-03-15 DIAGNOSIS — E78.2 MIXED HYPERLIPIDEMIA: ICD-10-CM

## 2023-03-15 DIAGNOSIS — G47.33 OSA (OBSTRUCTIVE SLEEP APNEA): ICD-10-CM

## 2023-03-15 DIAGNOSIS — M15.8 OTHER OSTEOARTHRITIS INVOLVING MULTIPLE JOINTS: ICD-10-CM

## 2023-03-22 PROBLEM — I69.30 HISTORY OF CEREBROVASCULAR ACCIDENT (CVA) WITH RESIDUAL DEFICIT: Status: ACTIVE | Noted: 2023-03-22

## 2023-03-22 PROBLEM — I63.9 STROKE (CEREBRUM) (HCC): Status: ACTIVE | Noted: 2019-01-01

## 2023-03-22 PROBLEM — M15.9 DEGENERATIVE JOINT DISEASE INVOLVING MULTIPLE JOINTS: Status: ACTIVE | Noted: 2023-03-22

## 2023-03-22 PROBLEM — Z86.79 HISTORY OF INTRACRANIAL HEMORRHAGE: Status: RESOLVED | Noted: 2019-01-15 | Resolved: 2023-03-22

## 2023-04-27 ENCOUNTER — OFFICE VISIT (OUTPATIENT)
Dept: CARDIOLOGY | Age: 63
End: 2023-04-27
Payer: MEDICARE

## 2023-04-27 VITALS
HEART RATE: 94 BPM | BODY MASS INDEX: 39.99 KG/M2 | HEIGHT: 65 IN | DIASTOLIC BLOOD PRESSURE: 94 MMHG | WEIGHT: 240 LBS | SYSTOLIC BLOOD PRESSURE: 190 MMHG

## 2023-04-27 DIAGNOSIS — R00.2 INTERMITTENT PALPITATIONS: Primary | ICD-10-CM

## 2023-04-27 DIAGNOSIS — I10 PRIMARY HYPERTENSION: ICD-10-CM

## 2023-04-27 PROCEDURE — 99214 OFFICE O/P EST MOD 30 MIN: CPT | Performed by: INTERNAL MEDICINE

## 2023-04-27 PROCEDURE — 3017F COLORECTAL CA SCREEN DOC REV: CPT | Performed by: INTERNAL MEDICINE

## 2023-04-27 PROCEDURE — G8427 DOCREV CUR MEDS BY ELIG CLIN: HCPCS | Performed by: INTERNAL MEDICINE

## 2023-04-27 PROCEDURE — 3077F SYST BP >= 140 MM HG: CPT | Performed by: INTERNAL MEDICINE

## 2023-04-27 PROCEDURE — 99204 OFFICE O/P NEW MOD 45 MIN: CPT | Performed by: INTERNAL MEDICINE

## 2023-04-27 PROCEDURE — 93010 ELECTROCARDIOGRAM REPORT: CPT | Performed by: INTERNAL MEDICINE

## 2023-04-27 PROCEDURE — 93005 ELECTROCARDIOGRAM TRACING: CPT | Performed by: INTERNAL MEDICINE

## 2023-04-27 PROCEDURE — G8417 CALC BMI ABV UP PARAM F/U: HCPCS | Performed by: INTERNAL MEDICINE

## 2023-04-27 PROCEDURE — 3080F DIAST BP >= 90 MM HG: CPT | Performed by: INTERNAL MEDICINE

## 2023-04-27 PROCEDURE — 1036F TOBACCO NON-USER: CPT | Performed by: INTERNAL MEDICINE

## 2023-04-27 RX ORDER — HYDROCHLOROTHIAZIDE 25 MG/1
25 TABLET ORAL EVERY MORNING
Qty: 90 TABLET | Refills: 1 | Status: SHIPPED | OUTPATIENT
Start: 2023-04-27

## 2023-04-27 RX ORDER — OXYBUTYNIN CHLORIDE 10 MG/1
10 TABLET, EXTENDED RELEASE ORAL DAILY
COMMUNITY

## 2023-04-27 RX ORDER — TRAZODONE HYDROCHLORIDE 50 MG/1
50 TABLET ORAL NIGHTLY
COMMUNITY

## 2023-04-27 RX ORDER — HYDROXYZINE HYDROCHLORIDE 25 MG/1
25 TABLET, FILM COATED ORAL 3 TIMES DAILY PRN
COMMUNITY

## 2023-04-27 NOTE — PATIENT INSTRUCTIONS
Your Loop Recorder placement Will Be Scheduled at:      [facility]    [place] [address] [facility], [address]   (located across from [address] [facility])    Located on the main floor of the [facility]. Report to our reception desk by the [employer]. Parking is free. Handicap parking is available by the main entrance. You may also park in [place] on Level 2 and enter building on the bridge to [facility]. Take elevator to the main floor. Our   Roberto Bernardo )will call you to schedule your procedure within a week. If you do not receive a phone call, please call the  directly at [phone] and leave a message, or call [place] office at [phone]. Date:______________________________    Arrival Time:________________________    Procedure Time:_____________________    Instructions:_____________________________      Bring Photo I.D. and insurance cards. Bring all Medications in the bottles. Pack an overnight bag in case you are required to spend the night. You will need someone to drive you home. The  will instruct you on holding food and drink the night before or morning of your procedure. You are to take your Medications, along with your Cardiac and/or Blood Pressure Medications, with small sips of water on the morning of your Procedure, unless instructed otherwise by your Physician. If you need additional directions please call [phone]. If you have any questions please feel free to call the [address] [place] [address] [address] office at [phone].

## 2023-04-27 NOTE — PROGRESS NOTES
Cardiology Consultation/Follow Up. [facility]    CC: Patient is here to establish cardiac care. HPI  Shawn Mathews is here to establish cardiac care. She ws previously followed by cardiologist at [facility]. She has significant hx of CVA in 2019 and recently 2020 when she was admitted to [facility] with right hemiparesis. Found to have pontine infarct. Underwent echocardiogram with bubble study which was negative for PFO. Started on aspirin and Plavix. Since then she has been in assisted living facility. Still has residual right arm and leg weakness. Getting physical therapy. From cardiac standpoint she denies any chest pain or angina. No significant dyspnea, orthopnea. Does have mild lower extremity edema bilaterally. Her blood pressure is uncontrolled. The log from facility reveals a systolic blood pressure [phone] on most of the readings.  on last check at [facility] in 2023.      Past Medical:  Past Medical History:   Diagnosis Date    Arrhythmia     Chronic pain     Edema     GERD (gastroesophageal reflux disease)     Hypertension     Stroke (cerebrum) ([address] 2019    Urticaria        Past Surgical:  Past Surgical History:   Procedure Laterality Date    CARPAL TUNNEL RELEASE Bilateral      SECTION      x 3    CHOLECYSTECTOMY      COLONOSCOPY      COLONOSCOPY  2018    [place]    UPPER GASTROINTESTINAL ENDOSCOPY         Family History:  Family History   Problem Relation Age of Onset    High Blood Pressure Mother     Arthritis Sister     Cancer Brother         colon cancer    Other Daughter         bipolar disorder       Social History:  Social History     Tobacco Use    Smoking status: Former     Types: Cigarettes     Start date:      Quit date:      Years since quittin.3    Smokeless tobacco: Never   Vaping Use    Vaping Use: Never used   Substance Use Topics    Alcohol use: No     Alcohol/week: 0.0 standard drinks    Drug use: No        REVIEW OF

## 2023-05-24 ENCOUNTER — OFFICE VISIT (OUTPATIENT)
Dept: NEUROLOGY | Age: 63
End: 2023-05-24
Payer: MEDICARE

## 2023-05-24 VITALS
HEIGHT: 64 IN | HEART RATE: 84 BPM | DIASTOLIC BLOOD PRESSURE: 82 MMHG | BODY MASS INDEX: 40.97 KG/M2 | OXYGEN SATURATION: 95 % | SYSTOLIC BLOOD PRESSURE: 162 MMHG | WEIGHT: 240 LBS

## 2023-05-24 DIAGNOSIS — G47.33 OSA (OBSTRUCTIVE SLEEP APNEA): ICD-10-CM

## 2023-05-24 DIAGNOSIS — I10 PRIMARY HYPERTENSION: ICD-10-CM

## 2023-05-24 DIAGNOSIS — Z87.891 EX-SMOKER: ICD-10-CM

## 2023-05-24 DIAGNOSIS — M62.838 MUSCLE SPASM OF RIGHT LOWER EXTREMITY: ICD-10-CM

## 2023-05-24 DIAGNOSIS — E66.01 SEVERE OBESITY (BMI >= 40) (HCC): ICD-10-CM

## 2023-05-24 DIAGNOSIS — I63.332 CEREBROVASCULAR ACCIDENT (CVA) DUE TO THROMBOSIS OF LEFT POSTERIOR CEREBRAL ARTERY (HCC): ICD-10-CM

## 2023-05-24 DIAGNOSIS — Z80.3 FAMILY HISTORY OF BREAST CANCER: ICD-10-CM

## 2023-05-24 DIAGNOSIS — G81.91 RIGHT HEMIPLEGIA (HCC): ICD-10-CM

## 2023-05-24 DIAGNOSIS — F41.9 ANXIETY AND DEPRESSION: ICD-10-CM

## 2023-05-24 DIAGNOSIS — I63.9 LEFT PONTINE STROKE (HCC): Primary | ICD-10-CM

## 2023-05-24 DIAGNOSIS — R20.2 NUMBNESS AND TINGLING OF RIGHT UPPER AND LOWER EXTREMITY: ICD-10-CM

## 2023-05-24 DIAGNOSIS — F32.A ANXIETY AND DEPRESSION: ICD-10-CM

## 2023-05-24 DIAGNOSIS — R20.0 NUMBNESS AND TINGLING OF RIGHT UPPER AND LOWER EXTREMITY: ICD-10-CM

## 2023-05-24 DIAGNOSIS — I69.30 HISTORY OF CEREBROVASCULAR ACCIDENT (CVA) WITH RESIDUAL DEFICIT: ICD-10-CM

## 2023-05-24 DIAGNOSIS — R73.01 ELEVATED FASTING GLUCOSE: ICD-10-CM

## 2023-05-24 DIAGNOSIS — E78.2 MIXED HYPERLIPIDEMIA: ICD-10-CM

## 2023-05-24 DIAGNOSIS — G47.9 SLEEP DIFFICULTIES: ICD-10-CM

## 2023-05-24 DIAGNOSIS — Z86.73 H/O: STROKE: ICD-10-CM

## 2023-05-24 PROCEDURE — 99214 OFFICE O/P EST MOD 30 MIN: CPT | Performed by: PSYCHIATRY & NEUROLOGY

## 2023-05-24 RX ORDER — LANOLIN ALCOHOL/MO/W.PET/CERES
3 CREAM (GRAM) TOPICAL NIGHTLY PRN
COMMUNITY

## 2023-05-24 RX ORDER — SERTRALINE HYDROCHLORIDE 25 MG/1
25 TABLET, FILM COATED ORAL DAILY
COMMUNITY
Start: 2023-05-20

## 2023-05-24 ASSESSMENT — ENCOUNTER SYMPTOMS
CHEST TIGHTNESS: 0
BACK PAIN: 1
EYE REDNESS: 0
NAUSEA: 0
EYE DISCHARGE: 0
FACIAL SWELLING: 0
WHEEZING: 0
VISUAL CHANGE: 0
PHOTOPHOBIA: 0
EYE PAIN: 0
VOICE CHANGE: 0
TROUBLE SWALLOWING: 0
CONSTIPATION: 0
VOMITING: 0
SINUS PRESSURE: 0
CHOKING: 0
SORE THROAT: 0
COUGH: 0
APNEA: 0
DIARRHEA: 0
ABDOMINAL DISTENTION: 0
ABDOMINAL PAIN: 0
BLOOD IN STOOL: 0
EYE ITCHING: 0
COLOR CHANGE: 0
SHORTNESS OF BREATH: 0

## 2023-05-24 NOTE — PATIENT INSTRUCTIONS
* FALL   PRECAUTIONS. * SUPERVISED   CARE          *   ADEQUATE   FLUID  INTAKE   AND  ELECTROLYTE  BALANCE             * KEEP  DAIRY  OF   THE  NEUROLOGICAL  SYMPTOMS          *  TO  MAINTAIN  REGULAR  SLEEP  WAKE  CYCLES. *   TO  HAVE  ADEQUATE  REST  AND   SLEEP    HOURS.          *    AVOID  USAGE OF   TOBACCO,  EXCESSIVE  ALCOHOL                AND   ILLEGAL   SUBSTANCES,  IF  ANY          *  Maintain   Healthy  Life Style    With   Periodic  Monitoring  Of         Any  Medical  Conditions  Including   Elevated  Blood  Pressure,  Lipid  Profile,       Blood  Sugar levels  And   Heart  Disease. *   Period   Screening  For  Cancers  Involving  Breast,  Colon,         Lungs  And  Other  Organs  As  Applicable,           In consultation   With  Your  Primary Care Providers. *  If   There is  Any  Significant  Worsening   Of  Current  Symptoms  And             Or  If    Any additional  New  Neurological  Symptoms  and          Significant  Concerns   Should  Call  911 or  Go  To  Emergency  Department            For  Further  Immediate  Evaluation.

## 2023-05-24 NOTE — PROGRESS NOTES
[facility]  Neurology    [address] [place] [address]  TEKED:[phone]   Fax: [phone]        SUBJECTIVE:       PATIENT ID:  Evelin Ga is a    LEFT      HANDED 60 y.o. female. Neurologic Problem  The patient's primary symptoms include clumsiness, focal sensory loss, focal weakness, a loss of balance and weakness. The patient's pertinent negatives include no memory loss, near-syncope, slurred speech, syncope or visual change. Primary symptoms comment: H/O   NEW  LEFT PONTINE  STROKE   IN JAN. 2023 ;   H/O   OLD  STROKE IN  JAN. 2019. This is a recurrent problem. The neurological problem developed suddenly. The problem has been gradually improving since onset. Associated symptoms include back pain, dizziness and light-headedness. Pertinent negatives include no abdominal pain, chest pain, confusion, fatigue, fever, headaches, nausea, neck pain, palpitations, shortness of breath or vomiting. Past treatments include medication, bed rest and sleep. The treatment provided mild relief. Her past medical history is significant for a CVA and mood changes. There is no history of a bleeding disorder, a clotting disorder, dementia, head trauma, liver disease or seizures. History obtained from  The   PATIENT         and other  available   medical  records   were  Also  reviewed. The  Duration,  Quality,  Severity,  Location,  Timing,  Context,  Modifying  Factors   Of   The   Chief   Complaint       And  Present  Illness  Was   Reviewed   In   Chronological   [facility].                                             PATIENT'S  MAIN  CONCERNS INCLUDE :                     1)        H/O   NEW  ON  SET  LEFT PONTINE  INFARCTION     IN  JAN. 2023                                  WITH     RIGHT  HEMIPLEGIA                           2)    PATIENT  WAS   HOSPITALIZED  IN    [facility]                                      AND     HAD    EVALUATIONS      AND

## 2023-06-20 ENCOUNTER — HOSPITAL ENCOUNTER (OUTPATIENT)
Dept: GENERAL RADIOLOGY | Age: 63
Discharge: HOME | End: 2023-06-22
Attending: ORTHOPAEDIC SURGERY
Payer: MEDICARE

## 2023-06-20 ENCOUNTER — OFFICE VISIT (OUTPATIENT)
Dept: ORTHOPEDIC SURGERY | Age: 63
End: 2023-06-20
Payer: MEDICARE

## 2023-06-20 VITALS
SYSTOLIC BLOOD PRESSURE: 150 MMHG | WEIGHT: 240 LBS | BODY MASS INDEX: 40.97 KG/M2 | HEART RATE: 84 BPM | DIASTOLIC BLOOD PRESSURE: 82 MMHG | HEIGHT: 64 IN

## 2023-06-20 DIAGNOSIS — G89.29 CHRONIC PAIN OF RIGHT KNEE: ICD-10-CM

## 2023-06-20 DIAGNOSIS — M25.561 CHRONIC PAIN OF RIGHT KNEE: Primary | ICD-10-CM

## 2023-06-20 DIAGNOSIS — M25.561 CHRONIC PAIN OF RIGHT KNEE: ICD-10-CM

## 2023-06-20 DIAGNOSIS — G89.29 CHRONIC PAIN OF RIGHT KNEE: Primary | ICD-10-CM

## 2023-06-20 PROCEDURE — 20610 DRAIN/INJ JOINT/BURSA W/O US: CPT | Performed by: PHYSICIAN ASSISTANT

## 2023-06-20 PROCEDURE — 99214 OFFICE O/P EST MOD 30 MIN: CPT | Performed by: PHYSICIAN ASSISTANT

## 2023-06-20 PROCEDURE — 73562 X-RAY EXAM OF KNEE 3: CPT

## 2023-06-20 PROCEDURE — PBSHW PBB SHADOW CHARGE: Performed by: PHYSICIAN ASSISTANT

## 2023-06-20 RX ORDER — TRIAMCINOLONE ACETONIDE 40 MG/ML
80 INJECTION, SUSPENSION INTRA-ARTICULAR; INTRAMUSCULAR ONCE
Status: COMPLETED | OUTPATIENT
Start: 2023-06-20 | End: 2023-06-20

## 2023-06-20 RX ORDER — BUPIVACAINE HYDROCHLORIDE 5 MG/ML
5 INJECTION, SOLUTION PERINEURAL ONCE
Status: COMPLETED | OUTPATIENT
Start: 2023-06-20 | End: 2023-06-20

## 2023-06-20 RX ADMIN — BUPIVACAINE HYDROCHLORIDE 25 MG: 5 INJECTION, SOLUTION PERINEURAL at 11:10

## 2023-06-20 RX ADMIN — TRIAMCINOLONE ACETONIDE 80 MG: 200 INJECTION, SUSPENSION INTRA-ARTICULAR; INTRAMUSCULAR at 11:10

## 2023-06-20 NOTE — PROGRESS NOTES
Orthopaedic Progress Note      CHIEF COMPLAINT: Right knee primary osteoarthritis    HISTORY OF PRESENT ILLNESS:       Ms. Meek  is a 60 y.o. female  who presents with right knee primary osteoarthritis. Patient is status post stroke in January. Right side affected. Since he began walking again she has been noticing increasing pain and giving way sensation to the right knee. Past Medical History:    Past Medical History:   Diagnosis Date    Arrhythmia     Chronic pain     Edema     GERD (gastroesophageal reflux disease)     Hypertension     Stroke (cerebrum) ([address] 2019    Urticaria        Past Surgical History:    Past Surgical History:   Procedure Laterality Date    CARPAL TUNNEL RELEASE Bilateral      SECTION      x 3    CHOLECYSTECTOMY      COLONOSCOPY      COLONOSCOPY  2018    [facility]    UPPER GASTROINTESTINAL ENDOSCOPY           Current  Medications:  Current Outpatient Medications   Medication Sig Dispense Refill    diphenhydrAMINE (SOMINEX) 25 MG tablet Take 1 tablet by mouth in the morning and 1 tablet at noon and 1 tablet in the evening.       melatonin 3 MG TABS tablet Take 1 tablet by mouth nightly as needed      sertraline (ZOLOFT) 25 MG tablet Take 1 tablet by mouth daily      OXYGEN Inhale 2 L into the lungs 2L @ hs      oxybutynin (DITROPAN-XL) 10 MG extended release tablet Take 1 tablet by mouth daily      TIZANIDINE HCL PO Take 6 mg by mouth      traZODone (DESYREL) 50 MG tablet Take 1 tablet by mouth nightly      hydrOXYzine HCl (ATARAX) 25 MG tablet Take 1 tablet by mouth 3 times daily as needed for Itching (Patient not taking: Reported on 2023)      hydroCHLOROthiazide (HYDRODIURIL) 25 MG tablet Take 1 tablet by mouth every morning 90 tablet 1    amantadine (SYMMETREL) 100 MG capsule Take 1 capsule by mouth 2 times daily 60 capsule 0    dilTIAZem (CARDIZEM CD) 120 MG extended release capsule Take 1 capsule by mouth daily 30 capsule 0    hydrALAZINE (APRESOLINE) 25

## 2023-06-29 ENCOUNTER — OFFICE VISIT (OUTPATIENT)
Dept: CARDIOLOGY | Age: 63
End: 2023-06-29
Payer: MEDICARE

## 2023-06-29 ENCOUNTER — APPOINTMENT (OUTPATIENT)
Dept: NON INVASIVE DIAGNOSTICS | Age: 63
End: 2023-06-29
Payer: MEDICARE

## 2023-06-29 VITALS
DIASTOLIC BLOOD PRESSURE: 60 MMHG | HEART RATE: 81 BPM | BODY MASS INDEX: 40.97 KG/M2 | SYSTOLIC BLOOD PRESSURE: 130 MMHG | HEIGHT: 64 IN | WEIGHT: 240 LBS

## 2023-06-29 DIAGNOSIS — R00.2 INTERMITTENT PALPITATIONS: Primary | ICD-10-CM

## 2023-06-29 PROCEDURE — 93010 ELECTROCARDIOGRAM REPORT: CPT | Performed by: INTERNAL MEDICINE

## 2023-06-29 PROCEDURE — 3017F COLORECTAL CA SCREEN DOC REV: CPT | Performed by: INTERNAL MEDICINE

## 2023-06-29 PROCEDURE — G8427 DOCREV CUR MEDS BY ELIG CLIN: HCPCS | Performed by: INTERNAL MEDICINE

## 2023-06-29 PROCEDURE — G8417 CALC BMI ABV UP PARAM F/U: HCPCS | Performed by: INTERNAL MEDICINE

## 2023-06-29 PROCEDURE — 3078F DIAST BP <80 MM HG: CPT | Performed by: INTERNAL MEDICINE

## 2023-06-29 PROCEDURE — 1036F TOBACCO NON-USER: CPT | Performed by: INTERNAL MEDICINE

## 2023-06-29 PROCEDURE — 93005 ELECTROCARDIOGRAM TRACING: CPT | Performed by: INTERNAL MEDICINE

## 2023-06-29 PROCEDURE — 99214 OFFICE O/P EST MOD 30 MIN: CPT | Performed by: INTERNAL MEDICINE

## 2023-06-29 PROCEDURE — 3074F SYST BP LT 130 MM HG: CPT | Performed by: INTERNAL MEDICINE

## 2023-06-29 RX ORDER — DILTIAZEM HYDROCHLORIDE 180 MG/1
180 CAPSULE, COATED, EXTENDED RELEASE ORAL DAILY
Qty: 30 CAPSULE | Refills: 3 | Status: SHIPPED | OUTPATIENT
Start: 2023-06-29

## 2023-07-12 ENCOUNTER — HOSPITAL ENCOUNTER (OUTPATIENT)
Dept: NON INVASIVE DIAGNOSTICS | Age: 63
Discharge: HOME | End: 2023-07-12
Attending: INTERNAL MEDICINE
Payer: MEDICARE

## 2023-07-12 DIAGNOSIS — R00.2 INTERMITTENT PALPITATIONS: ICD-10-CM

## 2023-07-12 DIAGNOSIS — I63.9 CRYPTOGENIC STROKE (HCC): ICD-10-CM

## 2023-07-12 PROCEDURE — 93270 REMOTE 30 DAY ECG REV/REPORT: CPT

## 2023-07-12 PROCEDURE — 93271 ECG/MONITORING AND ANALYSIS: CPT

## 2023-08-01 ENCOUNTER — OFFICE VISIT (OUTPATIENT)
Dept: ORTHOPEDIC SURGERY | Age: 63
End: 2023-08-01
Payer: MEDICARE

## 2023-08-01 ENCOUNTER — TELEPHONE (OUTPATIENT)
Dept: ORTHOPEDIC SURGERY | Age: 63
End: 2023-08-01

## 2023-08-01 ENCOUNTER — HOSPITAL ENCOUNTER (OUTPATIENT)
Dept: GENERAL RADIOLOGY | Age: 63
Discharge: HOME | End: 2023-08-03
Payer: MEDICARE

## 2023-08-01 VITALS
BODY MASS INDEX: 40.97 KG/M2 | DIASTOLIC BLOOD PRESSURE: 88 MMHG | OXYGEN SATURATION: 94 % | HEIGHT: 64 IN | SYSTOLIC BLOOD PRESSURE: 136 MMHG | WEIGHT: 240 LBS | RESPIRATION RATE: 16 BRPM | HEART RATE: 95 BPM

## 2023-08-01 DIAGNOSIS — M25.552 HIP PAIN, LEFT: ICD-10-CM

## 2023-08-01 DIAGNOSIS — G89.29 CHRONIC PAIN OF LEFT KNEE: ICD-10-CM

## 2023-08-01 DIAGNOSIS — M25.561 CHRONIC PAIN OF RIGHT KNEE: Primary | ICD-10-CM

## 2023-08-01 DIAGNOSIS — M25.562 CHRONIC PAIN OF LEFT KNEE: ICD-10-CM

## 2023-08-01 DIAGNOSIS — G89.29 CHRONIC PAIN OF RIGHT KNEE: Primary | ICD-10-CM

## 2023-08-01 PROCEDURE — G8427 DOCREV CUR MEDS BY ELIG CLIN: HCPCS | Performed by: PHYSICIAN ASSISTANT

## 2023-08-01 PROCEDURE — PBSHW PBB SHADOW CHARGE: Performed by: PHYSICIAN ASSISTANT

## 2023-08-01 PROCEDURE — 3017F COLORECTAL CA SCREEN DOC REV: CPT | Performed by: PHYSICIAN ASSISTANT

## 2023-08-01 PROCEDURE — G8417 CALC BMI ABV UP PARAM F/U: HCPCS | Performed by: PHYSICIAN ASSISTANT

## 2023-08-01 PROCEDURE — 3079F DIAST BP 80-89 MM HG: CPT | Performed by: PHYSICIAN ASSISTANT

## 2023-08-01 PROCEDURE — 73502 X-RAY EXAM HIP UNI 2-3 VIEWS: CPT

## 2023-08-01 PROCEDURE — 3075F SYST BP GE 130 - 139MM HG: CPT | Performed by: PHYSICIAN ASSISTANT

## 2023-08-01 PROCEDURE — 1036F TOBACCO NON-USER: CPT | Performed by: PHYSICIAN ASSISTANT

## 2023-08-01 PROCEDURE — 20610 DRAIN/INJ JOINT/BURSA W/O US: CPT | Performed by: PHYSICIAN ASSISTANT

## 2023-08-01 PROCEDURE — 99213 OFFICE O/P EST LOW 20 MIN: CPT | Performed by: PHYSICIAN ASSISTANT

## 2023-08-01 PROCEDURE — 99214 OFFICE O/P EST MOD 30 MIN: CPT | Performed by: PHYSICIAN ASSISTANT

## 2023-08-01 RX ORDER — HYALURONATE SODIUM 10 MG/ML
20 SYRINGE (ML) INTRAARTICULAR ONCE
Status: COMPLETED | OUTPATIENT
Start: 2023-08-01 | End: 2023-08-01

## 2023-08-01 RX ADMIN — Medication 20 MG: at 11:53

## 2023-08-01 NOTE — PROGRESS NOTES
Orthopaedic Progress Note      CHIEF COMPLAINT: Knee primary osteoarthritis, left hip pain    HISTORY OF PRESENT ILLNESS:       Ms. Weiss  is a 60 y.o. female  who presents with right knee primary osteoarthritis, left hip pain. Patient did receive intra-articular corticosteroid injection into the right knee. Stated it did help for approximate 3 to 4 days. She is noted to be status post stroke right side affected. Stroke occurred in January. She is currently in a nursing home working on therapy and gait training. Past Medical History:    Past Medical History:   Diagnosis Date    Arrhythmia     Chronic pain     Edema     GERD (gastroesophageal reflux disease)     Hypertension     Stroke (cerebrum) ([address]) 2019    Urticaria        Past Surgical History:    Past Surgical History:   Procedure Laterality Date    CARPAL TUNNEL RELEASE Bilateral      SECTION      x 3    CHOLECYSTECTOMY      COLONOSCOPY      COLONOSCOPY  2018    [facility]    UPPER GASTROINTESTINAL ENDOSCOPY           Current  Medications:  Current Outpatient Medications   Medication Sig Dispense Refill    dilTIAZem (CARDIZEM CD) 180 MG extended release capsule Take 1 capsule by mouth daily 30 capsule 3    diphenhydrAMINE (SOMINEX) 25 MG tablet Take 1 tablet by mouth in the morning and 1 tablet at noon and 1 tablet in the evening.       melatonin 3 MG TABS tablet Take 1 tablet by mouth nightly as needed      sertraline (ZOLOFT) 25 MG tablet Take 1 tablet by mouth daily      OXYGEN Inhale 2 L into the lungs 2L @ hs      oxybutynin (DITROPAN-XL) 10 MG extended release tablet Take 1 tablet by mouth daily      hydrOXYzine HCl (ATARAX) 25 MG tablet Take 1 tablet by mouth 3 times daily as needed for Itching      hydroCHLOROthiazide (HYDRODIURIL) 25 MG tablet Take 1 tablet by mouth every morning 90 tablet 1    amantadine (SYMMETREL) 100 MG capsule Take 1 capsule by mouth 2 times daily 60 capsule 0    hydrALAZINE (APRESOLINE) 25 MG tablet

## 2023-08-14 ENCOUNTER — TELEPHONE (OUTPATIENT)
Dept: CARDIOLOGY | Age: 63
End: 2023-08-14

## 2023-08-16 NOTE — TELEPHONE ENCOUNTER
Notified patient of  Holter results. Instructed patient to keep upcoming appointment with cardiologist and to call our office for any questions.  Patient also instructed to utilize the E.D. for any Serious health issues

## 2023-08-28 ENCOUNTER — OFFICE VISIT (OUTPATIENT)
Dept: CARDIOLOGY | Age: 63
End: 2023-08-28
Payer: MEDICARE

## 2023-08-28 VITALS
BODY MASS INDEX: 42.91 KG/M2 | DIASTOLIC BLOOD PRESSURE: 68 MMHG | SYSTOLIC BLOOD PRESSURE: 136 MMHG | HEART RATE: 85 BPM | OXYGEN SATURATION: 95 % | WEIGHT: 250 LBS

## 2023-08-28 DIAGNOSIS — I10 HYPERTENSION, ESSENTIAL: Primary | ICD-10-CM

## 2023-08-28 DIAGNOSIS — I69.30 HISTORY OF CEREBROVASCULAR ACCIDENT (CVA) WITH RESIDUAL DEFICIT: ICD-10-CM

## 2023-08-28 DIAGNOSIS — G47.33 OBSTRUCTIVE SLEEP APNEA: ICD-10-CM

## 2023-08-28 DIAGNOSIS — E78.5 HYPERLIPIDEMIA LDL GOAL <70: ICD-10-CM

## 2023-08-28 DIAGNOSIS — G47.33 OSA (OBSTRUCTIVE SLEEP APNEA): ICD-10-CM

## 2023-08-28 DIAGNOSIS — E66.01 MORBID OBESITY WITH BMI OF 40.0-44.9, ADULT (HCC): ICD-10-CM

## 2023-08-28 PROCEDURE — 3078F DIAST BP <80 MM HG: CPT | Performed by: NURSE PRACTITIONER

## 2023-08-28 PROCEDURE — G8417 CALC BMI ABV UP PARAM F/U: HCPCS | Performed by: NURSE PRACTITIONER

## 2023-08-28 PROCEDURE — 3017F COLORECTAL CA SCREEN DOC REV: CPT | Performed by: NURSE PRACTITIONER

## 2023-08-28 PROCEDURE — 99214 OFFICE O/P EST MOD 30 MIN: CPT | Performed by: NURSE PRACTITIONER

## 2023-08-28 PROCEDURE — 3075F SYST BP GE 130 - 139MM HG: CPT | Performed by: NURSE PRACTITIONER

## 2023-08-28 PROCEDURE — 1036F TOBACCO NON-USER: CPT | Performed by: NURSE PRACTITIONER

## 2023-08-28 PROCEDURE — G8427 DOCREV CUR MEDS BY ELIG CLIN: HCPCS | Performed by: NURSE PRACTITIONER

## 2023-08-28 NOTE — PROGRESS NOTES
content appropriate. Motor and sensory not done     Labs:  CBC: No results for input(s): WBC, HGB, HCT, PLT in the last 72 hours. BMP: No results for input(s): NA, K, CO2, BUN, CREATININE, LABGLOM, GLUCOSE in the last 72 hours. PT/INR: No results for input(s): PROTIME, INR in the last 72 hours. FASTING LIPID PANEL:  Lab Results   Component Value Date/Time    HDL 50 01/16/2020 08:15 AM    LDLCALC 94.8 03/28/2019 12:00 AM    TRIG 110 01/16/2020 08:15 AM       EKG 6/29/2023: nsr, nl ecg       ECHO 1/24/2023 at [facility]:  Global left ventricular systolic function is normal. The EF is 60 %  visually. Left ventricular wall thickness is mildly increased. Normal  diastolic function. Concentric left ventricular hypertrophy. Right  Ventricle: The right ventricle is normal in size. Normal right  ventricular systolic function. Unable to assess right sided pressures  due to lack of measurable tricuspid regurgitation. Left Atrium: The left atrium is normal in size. IAS: No intracardiac shunt by agitated   saline injections. Right Atrium: The right atrium is normal in size. Mitral Valve: The mitral valve is normal in mobility and thickness. No   mitral regurgitation. Aortic Valve: The aortic valve is normal. No   aortic valve regurgitation. No aortic valve stenosis. The aortic valve   is probably trileaflet. Tricuspid Valve: Normal tricuspid valve. No   tricuspid regurgitation. Pulmonic Valve: Normal pulmonary valve. No   pulmonary regurgitation. Aorta: The aortic root exhibits normal size. Great Vessels: IVC: Normal size and course of the IVC. Pericardium: No   pericardial effusion. MRI brain 1/24/23  1. Acute appearing left pontine infarct at the level of the superior cerebellar   peduncle. No evidence for hemorrhagic transformation. Fourth ventricle remains   widely patent. 2. Old area of infarct left centrum semiovale extending caudally to the left   basal ganglial region.        Past Medical and Surgical History,

## 2023-08-29 ENCOUNTER — TELEPHONE (OUTPATIENT)
Dept: CARDIOLOGY | Age: 63
End: 2023-08-29

## 2023-08-29 NOTE — TELEPHONE ENCOUNTER
Spoke to nurse at the [employer] and informed him of orders per Marjorie Oconnell to increase Cardizem to 240mg daily. Nurse verbalized understanding and had no further questions at the time.

## 2023-08-29 NOTE — TELEPHONE ENCOUNTER
Received BP log from the [facility] for review. Please review the attached BP readings and advise.      Last Appt:  8/28/2023  Next Appt:   1/18/2024  Med verified in Epic

## 2023-08-30 ENCOUNTER — HOSPITAL ENCOUNTER (OUTPATIENT)
Dept: GENERAL RADIOLOGY | Age: 63
Discharge: HOME | End: 2023-09-01
Payer: MEDICARE

## 2023-08-30 DIAGNOSIS — M25.552 HIP PAIN, LEFT: ICD-10-CM

## 2023-08-30 PROCEDURE — 20610 DRAIN/INJ JOINT/BURSA W/O US: CPT

## 2023-08-30 PROCEDURE — 77002 NEEDLE LOCALIZATION BY XRAY: CPT

## 2023-08-30 PROCEDURE — 6360000002 HC RX W HCPCS

## 2023-08-30 PROCEDURE — 2500000003 HC RX 250 WO HCPCS

## 2023-08-30 NOTE — TELEPHONE ENCOUNTER
Patient's daughter Elena Gutierrez called the office upset stating that during the appt 8/28/23 Aster Music told her mom that she was going to put her back on an old medication but the patient was only increased on her current medication. Daughter stated that the patient is confused at being told different plan than what was done and would like an explanation as to why. Verbal consent on phone from patient to speak to daughter (pending POA paperwork).     Daughter Verna Riddle  Cell: [phone]

## 2023-08-30 NOTE — TELEPHONE ENCOUNTER
Called and reported orders per Jamee Boswell to Nurse Tulio Palmer at [address] to discontinue the Cardizem, start Norvasc 10mg daily tomorrow. Also verified hydrochlorothiazide 25mg daily every morning. Nurse Tulio Palmer verbalized understanding of all and had no further questions.

## 2023-09-18 ENCOUNTER — TELEPHONE (OUTPATIENT)
Dept: CARDIOLOGY CLINIC | Age: 63
End: 2023-09-18

## 2023-09-18 NOTE — TELEPHONE ENCOUNTER
I received a call from Flor at the [address]  regarding the patients appt for today. She said that they took her to [place] office because she said that's where she always goes to see the cardiologist. miryam doesn't have a schedule at that location and the appt is showing as a new pt appt. Flor said the  will be in tomorrow and she is going to check with her and call back. I am seeing an encounter from 08/29/2023 from cardiology jacy in [place], so I'm not sure how pt was scheduled with dr Suellen Redd in [facility]? Was she supposed to be scheduled with leyla? Or with [place]?

## 2023-09-19 NOTE — TELEPHONE ENCOUNTER
Spoke to [place]uff at [employer]. She states pt wishes to reschedule the appt to be established at [address] [place] [address]. Appt rescheduled.

## 2023-09-27 ENCOUNTER — OFFICE VISIT (OUTPATIENT)
Dept: NEUROLOGY | Age: 63
End: 2023-09-27
Payer: MEDICARE

## 2023-09-27 VITALS
WEIGHT: 250 LBS | DIASTOLIC BLOOD PRESSURE: 84 MMHG | SYSTOLIC BLOOD PRESSURE: 136 MMHG | HEIGHT: 64 IN | BODY MASS INDEX: 42.68 KG/M2 | HEART RATE: 84 BPM | OXYGEN SATURATION: 96 %

## 2023-09-27 DIAGNOSIS — G81.91 RIGHT HEMIPLEGIA (HCC): ICD-10-CM

## 2023-09-27 DIAGNOSIS — I63.9 LEFT PONTINE STROKE (HCC): ICD-10-CM

## 2023-09-27 DIAGNOSIS — E66.01 SEVERE OBESITY (BMI >= 40) (HCC): ICD-10-CM

## 2023-09-27 DIAGNOSIS — Z80.3 FAMILY HISTORY OF BREAST CANCER: ICD-10-CM

## 2023-09-27 DIAGNOSIS — Z87.891 EX-SMOKER: ICD-10-CM

## 2023-09-27 DIAGNOSIS — I10 PRIMARY HYPERTENSION: ICD-10-CM

## 2023-09-27 DIAGNOSIS — F41.9 ANXIETY AND DEPRESSION: ICD-10-CM

## 2023-09-27 DIAGNOSIS — M62.838 MUSCLE SPASM OF RIGHT LOWER EXTREMITY: ICD-10-CM

## 2023-09-27 DIAGNOSIS — R73.01 ELEVATED FASTING GLUCOSE: ICD-10-CM

## 2023-09-27 DIAGNOSIS — F32.A ANXIETY AND DEPRESSION: ICD-10-CM

## 2023-09-27 DIAGNOSIS — E78.2 MIXED HYPERLIPIDEMIA: ICD-10-CM

## 2023-09-27 DIAGNOSIS — G47.9 SLEEP DIFFICULTIES: ICD-10-CM

## 2023-09-27 DIAGNOSIS — G47.33 OSA (OBSTRUCTIVE SLEEP APNEA): ICD-10-CM

## 2023-09-27 DIAGNOSIS — I63.332 CEREBROVASCULAR ACCIDENT (CVA) DUE TO THROMBOSIS OF LEFT POSTERIOR CEREBRAL ARTERY (HCC): Primary | ICD-10-CM

## 2023-09-27 PROCEDURE — 99215 OFFICE O/P EST HI 40 MIN: CPT | Performed by: PSYCHIATRY & NEUROLOGY

## 2023-09-27 PROCEDURE — G8427 DOCREV CUR MEDS BY ELIG CLIN: HCPCS | Performed by: PSYCHIATRY & NEUROLOGY

## 2023-09-27 PROCEDURE — 1036F TOBACCO NON-USER: CPT | Performed by: PSYCHIATRY & NEUROLOGY

## 2023-09-27 PROCEDURE — 3075F SYST BP GE 130 - 139MM HG: CPT | Performed by: PSYCHIATRY & NEUROLOGY

## 2023-09-27 PROCEDURE — 99214 OFFICE O/P EST MOD 30 MIN: CPT | Performed by: PSYCHIATRY & NEUROLOGY

## 2023-09-27 PROCEDURE — G8417 CALC BMI ABV UP PARAM F/U: HCPCS | Performed by: PSYCHIATRY & NEUROLOGY

## 2023-09-27 PROCEDURE — 3079F DIAST BP 80-89 MM HG: CPT | Performed by: PSYCHIATRY & NEUROLOGY

## 2023-09-27 PROCEDURE — 3017F COLORECTAL CA SCREEN DOC REV: CPT | Performed by: PSYCHIATRY & NEUROLOGY

## 2023-09-27 RX ORDER — CLOPIDOGREL BISULFATE 75 MG/1
1 TABLET ORAL DAILY
COMMUNITY
Start: 2023-09-18 | End: 2023-09-27 | Stop reason: SDUPTHER

## 2023-09-27 RX ORDER — CETIRIZINE HYDROCHLORIDE 10 MG/1
10 TABLET ORAL DAILY
COMMUNITY

## 2023-09-27 RX ORDER — SENNA AND DOCUSATE SODIUM 50; 8.6 MG/1; MG/1
2 TABLET, FILM COATED ORAL 2 TIMES DAILY
COMMUNITY

## 2023-09-27 RX ORDER — AMLODIPINE BESYLATE 10 MG/1
1 TABLET ORAL DAILY
COMMUNITY
Start: 2023-09-23

## 2023-09-27 RX ORDER — CLOPIDOGREL BISULFATE 75 MG/1
75 TABLET ORAL DAILY
Qty: 30 TABLET | Refills: 5 | Status: SHIPPED | OUTPATIENT
Start: 2023-09-27

## 2023-09-27 ASSESSMENT — ENCOUNTER SYMPTOMS
TROUBLE SWALLOWING: 0
SINUS PRESSURE: 0
EYE DISCHARGE: 0
CHOKING: 0
BLOOD IN STOOL: 0
EYE REDNESS: 0
BACK PAIN: 1
VOICE CHANGE: 0
DIARRHEA: 0
COLOR CHANGE: 0
ABDOMINAL DISTENTION: 0
CONSTIPATION: 0
CHEST TIGHTNESS: 0
PHOTOPHOBIA: 0
EYE ITCHING: 0
SHORTNESS OF BREATH: 0
APNEA: 0
EYE PAIN: 0
FACIAL SWELLING: 0
WHEEZING: 0

## 2023-09-27 NOTE — PROGRESS NOTES
VISITING DIAGNOSIS:        ICD-10-CM    1. Cerebrovascular accident (CVA) due to thrombosis of left posterior cerebral artery ([address])  I63.332       2. Severe obesity (BMI >= 40) ([facility])  E66.01       3. Elevated fasting glucose  R73.01       4. Mixed hyperlipidemia  E78.2       5. Left pontine stroke ([facility])  I63.9       6. Ex-smoker  Z87.891       7. Family history of breast cancer  Z80.3       8. TROY (obstructive sleep apnea)  G47.33       9. Right hemiplegia ([facility])  G81.91       10. Primary hypertension  I10       11. Anxiety and depression  F41.9     F32. A       12. Sleep difficulties  G47.9       13. Muscle spasm of right lower extremity  M62.838                    CONCERNS   &   INCREASED   RISK   FOR         * TIA,  CEREBRO  VASCULAR  ISCHEMIA, STROKE     *   DIZZINESS,   VERTEBROBASILAR  INSUFFICIENCY ,        *  GAIT  DIFFICULTIES  &   BALANCE PROBLMES   AND  FALL                VARIOUS  RISK   FACTORS   WERE  REVIEWED   AND   DISCUSSED. *  PATIENT   HAS  MULTIPLE   MEDICAL, NEUROLOGICAL                        AND   MENTAL HEALTH   PROBLEMS . PATIENT'S   MANAGEMENT  IS  CHALLENGING. PLAN:                         [place]  Of  The  Diagnoses,  The  Management & Treatment  Options            AND    Care  plan  Were          Reviewed and   Discussed   With  patient. * Goals  And  Expectations  Of  The  Therapy  Discussed   And  Reviewed. *   Benefits   And   Side  Effect  Profile  Of  Medication/s   Were   Discussed                * Need   For  Further   Follow up For  The  Various  Problems Were  discussed. * Results  Of  The  Previous  Diagnostic tests were reviewed and  discussed                   Medical  Decision  Making  Was  Made  Based on the   Complexity  Of  Above  Mentioned  Diagnoses,    Data reviewed             And    Risk  Of  Significant   Co morbidities and   complicating   Factors.                  Medical  Decision  Was   High

## 2023-10-12 ENCOUNTER — TELEPHONE (OUTPATIENT)
Dept: CARDIOLOGY CLINIC | Age: 63
End: 2023-10-12

## 2023-10-12 NOTE — TELEPHONE ENCOUNTER
Called pt and LMOM about no show on 10/12/23 for new pt appt with Dr Tess Quintero. Asked pt to call back to r/s appt.

## 2023-10-19 ENCOUNTER — OUTSIDE SERVICES (OUTPATIENT)
Dept: FAMILY MEDICINE CLINIC | Age: 63
End: 2023-10-19

## 2023-10-19 VITALS
SYSTOLIC BLOOD PRESSURE: 120 MMHG | OXYGEN SATURATION: 94 % | RESPIRATION RATE: 16 BRPM | HEART RATE: 86 BPM | DIASTOLIC BLOOD PRESSURE: 78 MMHG | TEMPERATURE: 97 F | WEIGHT: 260.8 LBS | BODY MASS INDEX: 44.77 KG/M2

## 2023-10-19 DIAGNOSIS — E66.01 SEVERE OBESITY (BMI >= 40) (HCC): ICD-10-CM

## 2023-10-19 DIAGNOSIS — J30.2 SEASONAL ALLERGIES: ICD-10-CM

## 2023-10-19 DIAGNOSIS — I63.332 CEREBROVASCULAR ACCIDENT (CVA) DUE TO THROMBOSIS OF LEFT POSTERIOR CEREBRAL ARTERY (HCC): ICD-10-CM

## 2023-10-19 DIAGNOSIS — F41.9 ANXIETY AND DEPRESSION: ICD-10-CM

## 2023-10-19 DIAGNOSIS — G47.33 OSA (OBSTRUCTIVE SLEEP APNEA): ICD-10-CM

## 2023-10-19 DIAGNOSIS — I10 PRIMARY HYPERTENSION: ICD-10-CM

## 2023-10-19 DIAGNOSIS — E78.2 MIXED HYPERLIPIDEMIA: ICD-10-CM

## 2023-10-19 DIAGNOSIS — F51.01 PRIMARY INSOMNIA: ICD-10-CM

## 2023-10-19 DIAGNOSIS — R32 URINARY INCONTINENCE DUE TO URETHRAL SPHINCTER INCOMPETENCE: ICD-10-CM

## 2023-10-19 DIAGNOSIS — M15.8 OTHER OSTEOARTHRITIS INVOLVING MULTIPLE JOINTS: ICD-10-CM

## 2023-10-19 DIAGNOSIS — R63.5 WEIGHT GAIN: ICD-10-CM

## 2023-10-19 DIAGNOSIS — I63.9 LEFT PONTINE STROKE (HCC): ICD-10-CM

## 2023-10-19 DIAGNOSIS — N36.42 URINARY INCONTINENCE DUE TO URETHRAL SPHINCTER INCOMPETENCE: ICD-10-CM

## 2023-10-19 DIAGNOSIS — F32.A ANXIETY AND DEPRESSION: ICD-10-CM

## 2023-10-19 DIAGNOSIS — I63.81 LEFT SIDED LACUNAR INFARCTION (HCC): ICD-10-CM

## 2023-10-19 DIAGNOSIS — G81.91 RIGHT HEMIPLEGIA (HCC): ICD-10-CM

## 2023-10-19 DIAGNOSIS — I69.30 HISTORY OF CEREBROVASCULAR ACCIDENT (CVA) WITH RESIDUAL DEFICIT: Primary | ICD-10-CM

## 2023-10-19 ASSESSMENT — ENCOUNTER SYMPTOMS
TROUBLE SWALLOWING: 0
DIARRHEA: 0
COUGH: 0
RHINORRHEA: 0
CHEST TIGHTNESS: 0
ABDOMINAL DISTENTION: 0
EYE PAIN: 0
ABDOMINAL PAIN: 0
SORE THROAT: 0
CONSTIPATION: 0
SHORTNESS OF BREATH: 0
NAUSEA: 0
EYE DISCHARGE: 0
VOMITING: 0
ALLERGIC/IMMUNOLOGIC NEGATIVE: 1

## 2023-10-19 NOTE — PROGRESS NOTES
congestion, rhinorrhea, sore throat and trouble swallowing. Eyes:  Negative for pain and discharge. Respiratory:  Negative for cough, chest tightness and shortness of breath. Cardiovascular:  Positive for leg swelling. Negative for chest pain and palpitations. Gastrointestinal:  Negative for abdominal distention, abdominal pain, constipation, diarrhea, nausea and vomiting. Endocrine: Negative. Genitourinary:  Negative for difficulty urinating. Musculoskeletal:  Positive for arthralgias, gait problem and myalgias. Skin: Negative. Allergic/Immunologic: Negative. Neurological:  Positive for weakness and numbness (around lips for last week). Negative for dizziness, tremors and headaches. Hematological: Negative. Psychiatric/Behavioral:  Negative for agitation and sleep disturbance. The patient is not nervous/anxious. Objective:     Physical Exam  Vitals and nursing note reviewed. Constitutional:       General: She is not in acute distress. Appearance: She is obese. She is ill-appearing. She is not diaphoretic. HENT:      Head: Normocephalic and atraumatic. Right Ear: External ear normal.      Left Ear: External ear normal.      Nose: Nose normal. No congestion or rhinorrhea. Mouth/Throat:      Mouth: Mucous membranes are moist.      Pharynx: No oropharyngeal exudate. Eyes:      General: No scleral icterus. Right eye: No discharge. Left eye: No discharge. Extraocular Movements: Extraocular movements intact. Conjunctiva/sclera: Conjunctivae normal.      Pupils: Pupils are equal, round, and reactive to light. Cardiovascular:      Rate and Rhythm: Normal rate and regular rhythm. Pulses: Normal pulses. Heart sounds: Normal heart sounds. Pulmonary:      Effort: Pulmonary effort is normal. No respiratory distress. Breath sounds: Normal breath sounds. No wheezing, rhonchi or rales.    Abdominal:      General: Bowel sounds are

## 2023-12-07 ENCOUNTER — HOSPITAL ENCOUNTER (INPATIENT)
Age: 63
LOS: 6 days | Discharge: NURSING, REHAB OR LONG-TERM CARE | DRG: 871 | End: 2023-12-13
Attending: EMERGENCY MEDICINE | Admitting: STUDENT IN AN ORGANIZED HEALTH CARE EDUCATION/TRAINING PROGRAM
Payer: MEDICARE

## 2023-12-07 ENCOUNTER — OUTSIDE SERVICES (OUTPATIENT)
Dept: FAMILY MEDICINE CLINIC | Age: 63
End: 2023-12-07

## 2023-12-07 ENCOUNTER — APPOINTMENT (OUTPATIENT)
Dept: CT IMAGING | Age: 63
DRG: 871 | End: 2023-12-07
Payer: MEDICARE

## 2023-12-07 ENCOUNTER — APPOINTMENT (OUTPATIENT)
Dept: ULTRASOUND IMAGING | Age: 63
DRG: 871 | End: 2023-12-07
Payer: MEDICARE

## 2023-12-07 ENCOUNTER — APPOINTMENT (OUTPATIENT)
Dept: GENERAL RADIOLOGY | Age: 63
DRG: 871 | End: 2023-12-07
Payer: MEDICARE

## 2023-12-07 VITALS
OXYGEN SATURATION: 91 % | TEMPERATURE: 100.4 F | RESPIRATION RATE: 16 BRPM | HEART RATE: 100 BPM | BODY MASS INDEX: 47.55 KG/M2 | WEIGHT: 277 LBS | DIASTOLIC BLOOD PRESSURE: 77 MMHG | SYSTOLIC BLOOD PRESSURE: 131 MMHG

## 2023-12-07 DIAGNOSIS — E86.0 DEHYDRATION: ICD-10-CM

## 2023-12-07 DIAGNOSIS — I10 PRIMARY HYPERTENSION: ICD-10-CM

## 2023-12-07 DIAGNOSIS — R50.9 FEVER, UNSPECIFIED FEVER CAUSE: ICD-10-CM

## 2023-12-07 DIAGNOSIS — R32 URINARY INCONTINENCE DUE TO URETHRAL SPHINCTER INCOMPETENCE: ICD-10-CM

## 2023-12-07 DIAGNOSIS — F41.9 ANXIETY AND DEPRESSION: ICD-10-CM

## 2023-12-07 DIAGNOSIS — N20.0 KIDNEY STONES: Primary | ICD-10-CM

## 2023-12-07 DIAGNOSIS — N36.42 URINARY INCONTINENCE DUE TO URETHRAL SPHINCTER INCOMPETENCE: ICD-10-CM

## 2023-12-07 DIAGNOSIS — I63.81 LEFT SIDED LACUNAR INFARCTION (HCC): ICD-10-CM

## 2023-12-07 DIAGNOSIS — E66.01 SEVERE OBESITY (BMI >= 40) (HCC): ICD-10-CM

## 2023-12-07 DIAGNOSIS — E87.1 HYPONATREMIA: ICD-10-CM

## 2023-12-07 DIAGNOSIS — I63.9 LEFT PONTINE STROKE (HCC): ICD-10-CM

## 2023-12-07 DIAGNOSIS — I69.30 HISTORY OF CEREBROVASCULAR ACCIDENT (CVA) WITH RESIDUAL DEFICIT: ICD-10-CM

## 2023-12-07 DIAGNOSIS — M15.8 OTHER OSTEOARTHRITIS INVOLVING MULTIPLE JOINTS: ICD-10-CM

## 2023-12-07 DIAGNOSIS — J30.2 SEASONAL ALLERGIES: ICD-10-CM

## 2023-12-07 DIAGNOSIS — N39.0 URINARY TRACT INFECTION WITHOUT HEMATURIA, SITE UNSPECIFIED: Primary | ICD-10-CM

## 2023-12-07 DIAGNOSIS — G81.91 RIGHT HEMIPLEGIA (HCC): ICD-10-CM

## 2023-12-07 DIAGNOSIS — Z87.891 EX-SMOKER: ICD-10-CM

## 2023-12-07 DIAGNOSIS — F51.01 PRIMARY INSOMNIA: ICD-10-CM

## 2023-12-07 DIAGNOSIS — F32.A ANXIETY AND DEPRESSION: ICD-10-CM

## 2023-12-07 DIAGNOSIS — R10.11 RIGHT UPPER QUADRANT ABDOMINAL PAIN: ICD-10-CM

## 2023-12-07 DIAGNOSIS — E78.2 MIXED HYPERLIPIDEMIA: ICD-10-CM

## 2023-12-07 DIAGNOSIS — G47.33 OSA (OBSTRUCTIVE SLEEP APNEA): ICD-10-CM

## 2023-12-07 LAB
ALBUMIN SERPL BCG-MCNC: 3.3 G/DL (ref 3.5–5.1)
ALP SERPL-CCNC: 130 U/L (ref 38–126)
ALT SERPL W/O P-5'-P-CCNC: 12 U/L (ref 11–66)
ANION GAP SERPL CALC-SCNC: 10 MEQ/L (ref 8–16)
AST SERPL-CCNC: 11 U/L (ref 5–40)
BACTERIA URNS QL MICRO: ABNORMAL /HPF
BASOPHILS ABSOLUTE: 0 THOU/MM3 (ref 0–0.1)
BASOPHILS NFR BLD AUTO: 0.2 %
BILIRUB CONJ SERPL-MCNC: 0.3 MG/DL (ref 0–0.3)
BILIRUB SERPL-MCNC: 0.7 MG/DL (ref 0.3–1.2)
BILIRUB UR QL STRIP.AUTO: NEGATIVE
BUN SERPL-MCNC: 21 MG/DL (ref 7–22)
CALCIUM SERPL-MCNC: 8.9 MG/DL (ref 8.5–10.5)
CASTS #/AREA URNS LPF: ABNORMAL /LPF
CASTS 2: ABNORMAL /LPF
CHARACTER UR: ABNORMAL
CHLORIDE SERPL-SCNC: 85 MEQ/L (ref 98–111)
CO2 SERPL-SCNC: 32 MEQ/L (ref 23–33)
COLOR: ABNORMAL
CREAT SERPL-MCNC: 1 MG/DL (ref 0.4–1.2)
CRYSTALS URNS MICRO: ABNORMAL
DEPRECATED RDW RBC AUTO: 41.5 FL (ref 35–45)
EKG ATRIAL RATE: 103 BPM
EKG P AXIS: 48 DEGREES
EKG P-R INTERVAL: 130 MS
EKG Q-T INTERVAL: 356 MS
EKG QRS DURATION: 102 MS
EKG QTC CALCULATION (BAZETT): 466 MS
EKG R AXIS: 8 DEGREES
EKG T AXIS: 38 DEGREES
EKG VENTRICULAR RATE: 103 BPM
EOSINOPHIL NFR BLD AUTO: 0.1 %
EOSINOPHILS ABSOLUTE: 0 THOU/MM3 (ref 0–0.4)
EPITHELIAL CELLS, UA: ABNORMAL /HPF
ERYTHROCYTE [DISTWIDTH] IN BLOOD BY AUTOMATED COUNT: 13.4 % (ref 11.5–14.5)
FLUAV RNA RESP QL NAA+PROBE: NOT DETECTED
FLUBV RNA RESP QL NAA+PROBE: NOT DETECTED
GFR SERPL CREATININE-BSD FRML MDRD: > 60 ML/MIN/1.73M2
GLUCOSE SERPL-MCNC: 127 MG/DL (ref 70–108)
GLUCOSE UR QL STRIP.AUTO: NEGATIVE MG/DL
HCT VFR BLD AUTO: 35.8 % (ref 37–47)
HGB BLD-MCNC: 12.1 GM/DL (ref 12–16)
HGB UR QL STRIP.AUTO: ABNORMAL
IMM GRANULOCYTES # BLD AUTO: 0.09 THOU/MM3 (ref 0–0.07)
IMM GRANULOCYTES NFR BLD AUTO: 0.7 %
KETONES UR QL STRIP.AUTO: NEGATIVE
LACTIC ACID, SEPSIS: 1 MMOL/L (ref 0.5–1.9)
LIPASE SERPL-CCNC: 27.1 U/L (ref 5.6–51.3)
LYMPHOCYTES ABSOLUTE: 0.7 THOU/MM3 (ref 1–4.8)
LYMPHOCYTES NFR BLD AUTO: 5 %
MAGNESIUM SERPL-MCNC: 2 MG/DL (ref 1.6–2.4)
MCH RBC QN AUTO: 28.9 PG (ref 26–33)
MCHC RBC AUTO-ENTMCNC: 33.8 GM/DL (ref 32.2–35.5)
MCV RBC AUTO: 85.4 FL (ref 81–99)
MISCELLANEOUS 2: ABNORMAL
MONOCYTES ABSOLUTE: 1 THOU/MM3 (ref 0.4–1.3)
MONOCYTES NFR BLD AUTO: 7.7 %
NEUTROPHILS NFR BLD AUTO: 86.3 %
NITRITE UR QL STRIP: POSITIVE
NRBC BLD AUTO-RTO: 0 /100 WBC
NT-PROBNP SERPL IA-MCNC: 1177 PG/ML (ref 0–124)
OSMOLALITY SERPL CALC.SUM OF ELEC: 259.8 MOSMOL/KG (ref 275–300)
PH UR STRIP.AUTO: 5.5 [PH] (ref 5–9)
PLATELET # BLD AUTO: 192 THOU/MM3 (ref 130–400)
PMV BLD AUTO: 9.4 FL (ref 9.4–12.4)
POTASSIUM SERPL-SCNC: 3.1 MEQ/L (ref 3.5–5.2)
PROCALCITONIN SERPL IA-MCNC: 0.75 NG/ML (ref 0.01–0.09)
PROT SERPL-MCNC: 6.7 G/DL (ref 6.1–8)
PROT UR STRIP.AUTO-MCNC: 300 MG/DL
RBC # BLD AUTO: 4.19 MILL/MM3 (ref 4.2–5.4)
RBC URINE: > 100 /HPF
RENAL EPI CELLS #/AREA URNS HPF: ABNORMAL /[HPF]
SARS-COV-2 RNA RESP QL NAA+PROBE: NOT DETECTED
SEGMENTED NEUTROPHILS ABSOLUTE COUNT: 11.7 THOU/MM3 (ref 1.8–7.7)
SODIUM SERPL-SCNC: 127 MEQ/L (ref 135–145)
SP GR UR REFRACT.AUTO: 1.02 (ref 1–1.03)
TROPONIN, HIGH SENSITIVITY: 18 NG/L (ref 0–12)
UROBILINOGEN, URINE: 1 EU/DL (ref 0–1)
WBC # BLD AUTO: 13.6 THOU/MM3 (ref 4.8–10.8)
WBC #/AREA URNS HPF: > 200 /HPF
WBC #/AREA URNS HPF: ABNORMAL /[HPF]
YEAST LIKE FUNGI URNS QL MICRO: ABNORMAL

## 2023-12-07 PROCEDURE — 87077 CULTURE AEROBIC IDENTIFY: CPT

## 2023-12-07 PROCEDURE — 82570 ASSAY OF URINE CREATININE: CPT

## 2023-12-07 PROCEDURE — 2580000003 HC RX 258: Performed by: EMERGENCY MEDICINE

## 2023-12-07 PROCEDURE — 6370000000 HC RX 637 (ALT 250 FOR IP)

## 2023-12-07 PROCEDURE — 1200000000 HC SEMI PRIVATE

## 2023-12-07 PROCEDURE — 76775 US EXAM ABDO BACK WALL LIM: CPT

## 2023-12-07 PROCEDURE — 83930 ASSAY OF BLOOD OSMOLALITY: CPT

## 2023-12-07 PROCEDURE — 6370000000 HC RX 637 (ALT 250 FOR IP): Performed by: EMERGENCY MEDICINE

## 2023-12-07 PROCEDURE — 83605 ASSAY OF LACTIC ACID: CPT

## 2023-12-07 PROCEDURE — 2580000003 HC RX 258

## 2023-12-07 PROCEDURE — 84132 ASSAY OF SERUM POTASSIUM: CPT

## 2023-12-07 PROCEDURE — 87040 BLOOD CULTURE FOR BACTERIA: CPT

## 2023-12-07 PROCEDURE — 81001 URINALYSIS AUTO W/SCOPE: CPT

## 2023-12-07 PROCEDURE — 83690 ASSAY OF LIPASE: CPT

## 2023-12-07 PROCEDURE — 96365 THER/PROPH/DIAG IV INF INIT: CPT

## 2023-12-07 PROCEDURE — 80053 COMPREHEN METABOLIC PANEL: CPT

## 2023-12-07 PROCEDURE — 96361 HYDRATE IV INFUSION ADD-ON: CPT

## 2023-12-07 PROCEDURE — 36415 COLL VENOUS BLD VENIPUNCTURE: CPT

## 2023-12-07 PROCEDURE — 87186 SC STD MICRODIL/AGAR DIL: CPT

## 2023-12-07 PROCEDURE — 83735 ASSAY OF MAGNESIUM: CPT

## 2023-12-07 PROCEDURE — 85025 COMPLETE CBC W/AUTO DIFF WBC: CPT

## 2023-12-07 PROCEDURE — 87086 URINE CULTURE/COLONY COUNT: CPT

## 2023-12-07 PROCEDURE — 84145 PROCALCITONIN (PCT): CPT

## 2023-12-07 PROCEDURE — 6360000002 HC RX W HCPCS: Performed by: EMERGENCY MEDICINE

## 2023-12-07 PROCEDURE — 87636 SARSCOV2 & INF A&B AMP PRB: CPT

## 2023-12-07 PROCEDURE — 71045 X-RAY EXAM CHEST 1 VIEW: CPT

## 2023-12-07 PROCEDURE — 83880 ASSAY OF NATRIURETIC PEPTIDE: CPT

## 2023-12-07 PROCEDURE — 93010 ELECTROCARDIOGRAM REPORT: CPT | Performed by: INTERNAL MEDICINE

## 2023-12-07 PROCEDURE — 82248 BILIRUBIN DIRECT: CPT

## 2023-12-07 PROCEDURE — 99285 EMERGENCY DEPT VISIT HI MDM: CPT

## 2023-12-07 PROCEDURE — 93005 ELECTROCARDIOGRAM TRACING: CPT | Performed by: EMERGENCY MEDICINE

## 2023-12-07 PROCEDURE — 84484 ASSAY OF TROPONIN QUANT: CPT

## 2023-12-07 PROCEDURE — 74176 CT ABD & PELVIS W/O CONTRAST: CPT

## 2023-12-07 RX ORDER — SODIUM CHLORIDE 0.9 % (FLUSH) 0.9 %
5-40 SYRINGE (ML) INJECTION EVERY 12 HOURS SCHEDULED
Status: DISCONTINUED | OUTPATIENT
Start: 2023-12-07 | End: 2023-12-13 | Stop reason: HOSPADM

## 2023-12-07 RX ORDER — ONDANSETRON 4 MG/1
4 TABLET, ORALLY DISINTEGRATING ORAL EVERY 8 HOURS PRN
Status: DISCONTINUED | OUTPATIENT
Start: 2023-12-07 | End: 2023-12-13 | Stop reason: HOSPADM

## 2023-12-07 RX ORDER — TIZANIDINE 4 MG/1
6 TABLET ORAL 3 TIMES DAILY
Status: DISCONTINUED | OUTPATIENT
Start: 2023-12-07 | End: 2023-12-08

## 2023-12-07 RX ORDER — POTASSIUM CHLORIDE 20 MEQ/1
40 TABLET, EXTENDED RELEASE ORAL PRN
Status: DISCONTINUED | OUTPATIENT
Start: 2023-12-07 | End: 2023-12-13 | Stop reason: HOSPADM

## 2023-12-07 RX ORDER — LANOLIN ALCOHOL/MO/W.PET/CERES
3 CREAM (GRAM) TOPICAL NIGHTLY PRN
Status: DISCONTINUED | OUTPATIENT
Start: 2023-12-07 | End: 2023-12-13 | Stop reason: HOSPADM

## 2023-12-07 RX ORDER — ATORVASTATIN CALCIUM 80 MG/1
80 TABLET, FILM COATED ORAL DAILY
Status: DISCONTINUED | OUTPATIENT
Start: 2023-12-08 | End: 2023-12-13 | Stop reason: HOSPADM

## 2023-12-07 RX ORDER — AMLODIPINE BESYLATE 10 MG/1
10 TABLET ORAL DAILY
Status: DISCONTINUED | OUTPATIENT
Start: 2023-12-08 | End: 2023-12-10

## 2023-12-07 RX ORDER — 0.9 % SODIUM CHLORIDE 0.9 %
1000 INTRAVENOUS SOLUTION INTRAVENOUS ONCE
Status: COMPLETED | OUTPATIENT
Start: 2023-12-07 | End: 2023-12-07

## 2023-12-07 RX ORDER — ONDANSETRON 2 MG/ML
4 INJECTION INTRAMUSCULAR; INTRAVENOUS EVERY 6 HOURS PRN
Status: DISCONTINUED | OUTPATIENT
Start: 2023-12-07 | End: 2023-12-13 | Stop reason: HOSPADM

## 2023-12-07 RX ORDER — ACETAMINOPHEN 325 MG/1
650 TABLET ORAL EVERY 6 HOURS PRN
Status: DISCONTINUED | OUTPATIENT
Start: 2023-12-07 | End: 2023-12-13 | Stop reason: HOSPADM

## 2023-12-07 RX ORDER — CLOPIDOGREL BISULFATE 75 MG/1
75 TABLET ORAL DAILY
Status: DISCONTINUED | OUTPATIENT
Start: 2023-12-08 | End: 2023-12-13 | Stop reason: HOSPADM

## 2023-12-07 RX ORDER — HYDROCHLOROTHIAZIDE 25 MG/1
25 TABLET ORAL DAILY
Status: DISCONTINUED | OUTPATIENT
Start: 2023-12-08 | End: 2023-12-11

## 2023-12-07 RX ORDER — OXYBUTYNIN CHLORIDE 10 MG/1
10 TABLET, EXTENDED RELEASE ORAL DAILY
Status: DISCONTINUED | OUTPATIENT
Start: 2023-12-08 | End: 2023-12-13 | Stop reason: HOSPADM

## 2023-12-07 RX ORDER — TRAZODONE HYDROCHLORIDE 50 MG/1
50 TABLET ORAL NIGHTLY
Status: DISCONTINUED | OUTPATIENT
Start: 2023-12-07 | End: 2023-12-08

## 2023-12-07 RX ORDER — AMANTADINE HYDROCHLORIDE 100 MG/1
100 CAPSULE, GELATIN COATED ORAL 2 TIMES DAILY
Status: DISCONTINUED | OUTPATIENT
Start: 2023-12-07 | End: 2023-12-13 | Stop reason: HOSPADM

## 2023-12-07 RX ORDER — SERTRALINE HYDROCHLORIDE 25 MG/1
25 TABLET, FILM COATED ORAL DAILY
Status: DISCONTINUED | OUTPATIENT
Start: 2023-12-08 | End: 2023-12-13 | Stop reason: HOSPADM

## 2023-12-07 RX ORDER — HYDROCHLOROTHIAZIDE 25 MG/1
25 TABLET ORAL EVERY MORNING
Status: CANCELLED | OUTPATIENT
Start: 2023-12-08

## 2023-12-07 RX ORDER — ASPIRIN 81 MG/1
81 TABLET, CHEWABLE ORAL DAILY
Status: DISCONTINUED | OUTPATIENT
Start: 2023-12-08 | End: 2023-12-13 | Stop reason: HOSPADM

## 2023-12-07 RX ORDER — POLYETHYLENE GLYCOL 3350 17 G/17G
17 POWDER, FOR SOLUTION ORAL DAILY PRN
Status: DISCONTINUED | OUTPATIENT
Start: 2023-12-07 | End: 2023-12-13 | Stop reason: HOSPADM

## 2023-12-07 RX ORDER — POTASSIUM CHLORIDE 7.45 MG/ML
10 INJECTION INTRAVENOUS PRN
Status: DISCONTINUED | OUTPATIENT
Start: 2023-12-07 | End: 2023-12-13 | Stop reason: HOSPADM

## 2023-12-07 RX ORDER — ACETAMINOPHEN 650 MG/1
650 SUPPOSITORY RECTAL EVERY 6 HOURS PRN
Status: DISCONTINUED | OUTPATIENT
Start: 2023-12-07 | End: 2023-12-13 | Stop reason: HOSPADM

## 2023-12-07 RX ORDER — SODIUM CHLORIDE 0.9 % (FLUSH) 0.9 %
5-40 SYRINGE (ML) INJECTION PRN
Status: DISCONTINUED | OUTPATIENT
Start: 2023-12-07 | End: 2023-12-13 | Stop reason: HOSPADM

## 2023-12-07 RX ORDER — SODIUM CHLORIDE 9 MG/ML
INJECTION, SOLUTION INTRAVENOUS CONTINUOUS
Status: ACTIVE | OUTPATIENT
Start: 2023-12-07 | End: 2023-12-09

## 2023-12-07 RX ORDER — MAGNESIUM SULFATE IN WATER 40 MG/ML
2000 INJECTION, SOLUTION INTRAVENOUS PRN
Status: DISCONTINUED | OUTPATIENT
Start: 2023-12-07 | End: 2023-12-13 | Stop reason: HOSPADM

## 2023-12-07 RX ORDER — SODIUM CHLORIDE 9 MG/ML
INJECTION, SOLUTION INTRAVENOUS PRN
Status: DISCONTINUED | OUTPATIENT
Start: 2023-12-07 | End: 2023-12-13 | Stop reason: HOSPADM

## 2023-12-07 RX ADMIN — SODIUM CHLORIDE: 9 INJECTION, SOLUTION INTRAVENOUS at 22:50

## 2023-12-07 RX ADMIN — Medication 3 MG: at 23:59

## 2023-12-07 RX ADMIN — CEFEPIME 2000 MG: 2 INJECTION, POWDER, FOR SOLUTION INTRAVENOUS at 20:25

## 2023-12-07 RX ADMIN — ACETAMINOPHEN 650 MG: 325 TABLET ORAL at 23:59

## 2023-12-07 RX ADMIN — POTASSIUM BICARBONATE 40 MEQ: 782 TABLET, EFFERVESCENT ORAL at 18:53

## 2023-12-07 RX ADMIN — SODIUM CHLORIDE 1000 ML: 9 INJECTION, SOLUTION INTRAVENOUS at 18:00

## 2023-12-07 ASSESSMENT — ENCOUNTER SYMPTOMS
SHORTNESS OF BREATH: 0
CONSTIPATION: 0
ABDOMINAL DISTENTION: 0
ALLERGIC/IMMUNOLOGIC NEGATIVE: 1
RHINORRHEA: 0
CHEST TIGHTNESS: 0
SORE THROAT: 0
NAUSEA: 1
TROUBLE SWALLOWING: 0
COUGH: 0
EYE DISCHARGE: 0
ABDOMINAL PAIN: 1
DIARRHEA: 0
VOMITING: 1
EYE PAIN: 0

## 2023-12-07 ASSESSMENT — PAIN - FUNCTIONAL ASSESSMENT
PAIN_FUNCTIONAL_ASSESSMENT: ACTIVITIES ARE NOT PREVENTED
PAIN_FUNCTIONAL_ASSESSMENT: 0-10
PAIN_FUNCTIONAL_ASSESSMENT: NONE - DENIES PAIN

## 2023-12-07 ASSESSMENT — PAIN SCALES - GENERAL
PAINLEVEL_OUTOF10: 0
PAINLEVEL_OUTOF10: 3
PAINLEVEL_OUTOF10: 2
PAINLEVEL_OUTOF10: 4

## 2023-12-07 ASSESSMENT — PAIN DESCRIPTION - LOCATION
LOCATION: ABDOMEN;BACK
LOCATION: ABDOMEN
LOCATION: ABDOMEN

## 2023-12-07 ASSESSMENT — PAIN DESCRIPTION - DESCRIPTORS: DESCRIPTORS: ACHING

## 2023-12-07 ASSESSMENT — PAIN DESCRIPTION - ORIENTATION: ORIENTATION: LEFT;RIGHT

## 2023-12-07 NOTE — PROGRESS NOTES
Start date: 0     Quit date: 5     Years since quittin.9    Smokeless tobacco: Never   Substance Use Topics    Alcohol use: No     Alcohol/week: 0.0 standard drinks of alcohol      Allergies   Allergen Reactions    Latex Rash    Amoxicillin     Ciprofloxacin     Codeine     Demerol Hcl [Meperidine]     Docusate Hives    Metoprolol Succinate [Metoprolol]     Norvasc [Amlodipine]     Omeprazole     Prednisone     Propofol     Senna     Sulfa Antibiotics     Tomato     Ultram [Tramadol Hcl]     Iv [Iodides] Rash     Pt states got hot spots with rash. Lisinopril Rash    Losartan Rash    Z-Randall [Azithromycin] Anxiety       Health Maintenance   Topic Date Due    COVID-19 Vaccine (1) Never done    Depression Monitoring  Never done    HIV screen  Never done    Hepatitis C screen  Never done    Colorectal Cancer Screen  Never done    Shingles vaccine (2 of 3) 10/24/2009    DTaP/Tdap/Td vaccine (2 - Td or Tdap) 2018    Respiratory Syncytial Virus (RSV) age 61 yrs+ (1 - 1-dose 60+ series) Never done    A1C test (Diabetic or Prediabetic)  01/15/2020    Lipids  2021    Breast cancer screen  2021    Annual Wellness Visit (AWV)  Never done    Flu vaccine (1) 2023    Cervical cancer screen  2025    Hepatitis A vaccine  Aged Out    Hepatitis B vaccine  Aged Out    Hib vaccine  Aged Out    Meningococcal (ACWY) vaccine  Aged Out    Pneumococcal 0-64 years Vaccine  Aged Out       Subjective:      Review of Systems   Constitutional:  Positive for appetite change and fever. Negative for activity change. HENT:  Negative for congestion, rhinorrhea, sore throat and trouble swallowing. Eyes:  Negative for pain and discharge. Respiratory:  Negative for cough, chest tightness and shortness of breath. Cardiovascular:  Positive for leg swelling. Negative for chest pain and palpitations. Gastrointestinal:  Positive for abdominal pain, nausea and vomiting.  Negative for abdominal

## 2023-12-07 NOTE — ED NOTES
Pt arrived to ED via EMS with complaints of fever and fatigue. States symptoms started yesterday. EKG completed and IV access obtained. Telemetry applied. Pt appears to be in stable condition with respirations even and unlabored. Pts call light in reach.        Vanessa Madden RN  12/07/23 [id number]

## 2023-12-07 NOTE — ED PROVIDER NOTES
heart sounds, S1 normal and S2 normal. No murmur heard. No friction rub. No gallop. Pulmonary:      Effort: Pulmonary effort is normal.      Breath sounds: Normal breath sounds. No stridor. No decreased breath sounds, wheezing, rhonchi or rales. Chest:      Chest wall: No tenderness. Abdominal:      General: Bowel sounds are normal. There is no distension. Palpations: Abdomen is soft. There is no mass. Tenderness: There is no abdominal tenderness. There is no right CVA tenderness, left CVA tenderness, guarding or rebound. Negative signs include Zamudio's sign, Rovsing's sign, McBurney's sign, psoas sign and obturator sign. Hernia: No hernia is present. Musculoskeletal:         General: No tenderness. Normal range of motion. Cervical back: Normal range of motion and neck supple. Right lower leg: No edema. Left lower leg: No edema. Lymphadenopathy:      Cervical: No cervical adenopathy. Skin:     General: Skin is warm and dry. Capillary Refill: Capillary refill takes less than 2 seconds. Findings: No bruising, ecchymosis, lesion or rash. Neurological:      Mental Status: She is alert and oriented to person, place, and time. Mental status is at baseline. Cranial Nerves: No cranial nerve deficit. Sensory: No sensory deficit. Motor: No weakness. Coordination: Coordination normal.      Gait: Gait normal.      Deep Tendon Reflexes: Reflexes are normal and symmetric. Reflexes normal.   Psychiatric:         Speech: Speech normal.         Behavior: Behavior normal.         Thought Content:  Thought content normal.         Judgment: Judgment normal.           DIFFERENTIAL DIAGNOSIS:   COVID, pneumonia bronchitis influenza pneumonia urinary tract infection skin breakdown    DIAGNOSTIC RESULTS     EKG: All EKG's are interpreted by the Emergency Department Physician who either signs or Co-signs this chart in the absence of a cardiologist.  Sinus

## 2023-12-08 LAB
ANION GAP SERPL CALC-SCNC: 7 MEQ/L (ref 8–16)
BUN SERPL-MCNC: 20 MG/DL (ref 7–22)
CA-I BLD ISE-SCNC: 1.06 MMOL/L (ref 1.12–1.32)
CALCIUM SERPL-MCNC: 8.4 MG/DL (ref 8.5–10.5)
CHLORIDE 24H UR-SRATE: 77 MEQ/L
CHLORIDE SERPL-SCNC: 87 MEQ/L (ref 98–111)
CO2 SERPL-SCNC: 33 MEQ/L (ref 23–33)
CREAT SERPL-MCNC: 0.9 MG/DL (ref 0.4–1.2)
CREAT UR-MCNC: 129.4 MG/DL
DEPRECATED RDW RBC AUTO: 43.3 FL (ref 35–45)
ERYTHROCYTE [DISTWIDTH] IN BLOOD BY AUTOMATED COUNT: 13.2 % (ref 11.5–14.5)
GFR SERPL CREATININE-BSD FRML MDRD: > 60 ML/MIN/1.73M2
GLUCOSE SERPL-MCNC: 121 MG/DL (ref 70–108)
HCT VFR BLD AUTO: 35 % (ref 37–47)
HGB BLD-MCNC: 11.4 GM/DL (ref 12–16)
MCH RBC QN AUTO: 28.9 PG (ref 26–33)
MCHC RBC AUTO-ENTMCNC: 32.6 GM/DL (ref 32.2–35.5)
MCV RBC AUTO: 88.6 FL (ref 81–99)
OSMOLALITY SERPL: 271 MOSMOL/KG (ref 275–295)
PLATELET # BLD AUTO: 164 THOU/MM3 (ref 130–400)
PMV BLD AUTO: 9.8 FL (ref 9.4–12.4)
POTASSIUM SERPL-SCNC: 3.4 MEQ/L (ref 3.5–5.2)
POTASSIUM SERPL-SCNC: 3.5 MEQ/L (ref 3.5–5.2)
POTASSIUM UR-SCNC: 41 MEQ/L
RBC # BLD AUTO: 3.95 MILL/MM3 (ref 4.2–5.4)
SODIUM SERPL-SCNC: 127 MEQ/L (ref 135–145)
SODIUM UR-SCNC: 63 MEQ/L
UUN 24H UR-MCNC: 984 MG/DL
WBC # BLD AUTO: 10.1 THOU/MM3 (ref 4.8–10.8)

## 2023-12-08 PROCEDURE — 36415 COLL VENOUS BLD VENIPUNCTURE: CPT

## 2023-12-08 PROCEDURE — 94761 N-INVAS EAR/PLS OXIMETRY MLT: CPT

## 2023-12-08 PROCEDURE — 97535 SELF CARE MNGMENT TRAINING: CPT

## 2023-12-08 PROCEDURE — 6370000000 HC RX 637 (ALT 250 FOR IP)

## 2023-12-08 PROCEDURE — 82436 ASSAY OF URINE CHLORIDE: CPT

## 2023-12-08 PROCEDURE — 84300 ASSAY OF URINE SODIUM: CPT

## 2023-12-08 PROCEDURE — 6360000002 HC RX W HCPCS

## 2023-12-08 PROCEDURE — 94760 N-INVAS EAR/PLS OXIMETRY 1: CPT

## 2023-12-08 PROCEDURE — 94640 AIRWAY INHALATION TREATMENT: CPT

## 2023-12-08 PROCEDURE — 1200000000 HC SEMI PRIVATE

## 2023-12-08 PROCEDURE — 97166 OT EVAL MOD COMPLEX 45 MIN: CPT

## 2023-12-08 PROCEDURE — 84540 ASSAY OF URINE/UREA-N: CPT

## 2023-12-08 PROCEDURE — 84133 ASSAY OF URINE POTASSIUM: CPT

## 2023-12-08 PROCEDURE — 80048 BASIC METABOLIC PNL TOTAL CA: CPT

## 2023-12-08 PROCEDURE — 85027 COMPLETE CBC AUTOMATED: CPT

## 2023-12-08 PROCEDURE — 82330 ASSAY OF CALCIUM: CPT

## 2023-12-08 PROCEDURE — 99223 1ST HOSP IP/OBS HIGH 75: CPT | Performed by: STUDENT IN AN ORGANIZED HEALTH CARE EDUCATION/TRAINING PROGRAM

## 2023-12-08 PROCEDURE — 2580000003 HC RX 258

## 2023-12-08 PROCEDURE — 97110 THERAPEUTIC EXERCISES: CPT

## 2023-12-08 PROCEDURE — 94669 MECHANICAL CHEST WALL OSCILL: CPT

## 2023-12-08 RX ORDER — GUAIFENESIN/DEXTROMETHORPHAN 100-10MG/5
5 SYRUP ORAL EVERY 4 HOURS PRN
Status: DISCONTINUED | OUTPATIENT
Start: 2023-12-08 | End: 2023-12-13 | Stop reason: HOSPADM

## 2023-12-08 RX ORDER — IPRATROPIUM BROMIDE AND ALBUTEROL SULFATE 2.5; .5 MG/3ML; MG/3ML
1 SOLUTION RESPIRATORY (INHALATION) EVERY 4 HOURS PRN
Status: DISCONTINUED | OUTPATIENT
Start: 2023-12-08 | End: 2023-12-13 | Stop reason: HOSPADM

## 2023-12-08 RX ORDER — IPRATROPIUM BROMIDE AND ALBUTEROL SULFATE 2.5; .5 MG/3ML; MG/3ML
1 SOLUTION RESPIRATORY (INHALATION) ONCE
Status: COMPLETED | OUTPATIENT
Start: 2023-12-08 | End: 2023-12-08

## 2023-12-08 RX ORDER — HYDRALAZINE HYDROCHLORIDE 20 MG/ML
10 INJECTION INTRAMUSCULAR; INTRAVENOUS EVERY 6 HOURS PRN
Status: DISCONTINUED | OUTPATIENT
Start: 2023-12-08 | End: 2023-12-13 | Stop reason: HOSPADM

## 2023-12-08 RX ADMIN — GUAIFENESIN AND DEXTROMETHORPHAN 5 ML: 100; 10 SYRUP ORAL at 11:40

## 2023-12-08 RX ADMIN — ATORVASTATIN CALCIUM 80 MG: 80 TABLET, FILM COATED ORAL at 08:45

## 2023-12-08 RX ADMIN — AMANTADINE 100 MG: 100 CAPSULE ORAL at 08:45

## 2023-12-08 RX ADMIN — IPRATROPIUM BROMIDE AND ALBUTEROL SULFATE 1 DOSE: .5; 3 SOLUTION RESPIRATORY (INHALATION) at 11:19

## 2023-12-08 RX ADMIN — ONDANSETRON 4 MG: 2 INJECTION INTRAMUSCULAR; INTRAVENOUS at 18:30

## 2023-12-08 RX ADMIN — ACETAMINOPHEN 650 MG: 325 TABLET ORAL at 22:35

## 2023-12-08 RX ADMIN — MEROPENEM 1000 MG: 1 INJECTION, POWDER, FOR SOLUTION INTRAVENOUS at 16:34

## 2023-12-08 RX ADMIN — POTASSIUM CHLORIDE 60 MEQ: 1500 TABLET, EXTENDED RELEASE ORAL at 18:30

## 2023-12-08 RX ADMIN — OXYBUTYNIN CHLORIDE 10 MG: 10 TABLET, EXTENDED RELEASE ORAL at 08:45

## 2023-12-08 RX ADMIN — ACETAMINOPHEN 650 MG: 325 TABLET ORAL at 14:27

## 2023-12-08 RX ADMIN — IPRATROPIUM BROMIDE AND ALBUTEROL SULFATE 1 DOSE: .5; 3 SOLUTION RESPIRATORY (INHALATION) at 14:52

## 2023-12-08 RX ADMIN — SERTRALINE 25 MG: 25 TABLET, FILM COATED ORAL at 08:45

## 2023-12-08 RX ADMIN — ACETAMINOPHEN 650 MG: 325 TABLET ORAL at 09:37

## 2023-12-08 RX ADMIN — POLYETHYLENE GLYCOL 3350 17 G: 17 POWDER, FOR SOLUTION ORAL at 16:33

## 2023-12-08 RX ADMIN — AMANTADINE 100 MG: 100 CAPSULE ORAL at 20:24

## 2023-12-08 RX ADMIN — MEROPENEM 1000 MG: 1 INJECTION, POWDER, FOR SOLUTION INTRAVENOUS at 12:18

## 2023-12-08 ASSESSMENT — PAIN SCALES - GENERAL
PAINLEVEL_OUTOF10: 3
PAINLEVEL_OUTOF10: 6
PAINLEVEL_OUTOF10: 0
PAINLEVEL_OUTOF10: 3
PAINLEVEL_OUTOF10: 0

## 2023-12-08 ASSESSMENT — PAIN DESCRIPTION - LOCATION
LOCATION: ABDOMEN
LOCATION: HEAD

## 2023-12-08 ASSESSMENT — PAIN DESCRIPTION - DESCRIPTORS
DESCRIPTORS: ACHING;CRAMPING
DESCRIPTORS: ACHING

## 2023-12-08 ASSESSMENT — PAIN - FUNCTIONAL ASSESSMENT: PAIN_FUNCTIONAL_ASSESSMENT: ACTIVITIES ARE NOT PREVENTED

## 2023-12-08 ASSESSMENT — PAIN DESCRIPTION - ORIENTATION
ORIENTATION: MID
ORIENTATION: ANTERIOR

## 2023-12-08 NOTE — FLOWSHEET NOTE
12/08/23 1030   Treatment Team Notification   Reason for Communication Review case   Name of Team Member Notified Dr. Cornell Byers Team Role Physician Assistant   Method of Communication Secure Message   Response See orders   Notification Time [phone]     Notified patient is having painful, persistent  and non productive cough. Cough medication and respiratory panels/ cultures ordered.

## 2023-12-08 NOTE — DISCHARGE INSTR - COC
Continuity of Care Form    Patient Name: Radha Rivera   :  1960  MRN:  [id number]    Admit date:  2023  Discharge date:  23    Code Status Order: Full Code   Advance Directives:     Admitting Physician:  No admitting provider for patient encounter.   PCP: TREY Kumar CNP    Discharging Nurse: MARCUS KENDALL [facility] Unit/Room#: 6K-29/0-A  Discharging Unit Phone Number: [phone]    Emergency Contact:   Extended Emergency Contact Information  Primary Emergency Contact: Milagro Traore [facility] [address] [address] Phone: [phone]  Mobile Phone: [phone]  Relation: Child    Past Surgical History:  Past Surgical History:   Procedure Laterality Date    CARPAL TUNNEL RELEASE Bilateral      SECTION      x 3    CHOLECYSTECTOMY      COLONOSCOPY      COLONOSCOPY  2018    [facility]    UPPER GASTROINTESTINAL ENDOSCOPY         Immunization History:   Immunization History   Administered Date(s) Administered    TDaP, ADACEL (age 6y-58y), Radonna Seal (age 10y+), IM, 0.5mL 2008    Zoster Live (Zostavax) 2009       Active Problems:  Patient Active Problem List   Diagnosis Code    Primary hypertension I10    Intermittent palpitations with tachycardia of max of 119 bpm  on exertion R00.2    Family history of breast cancer Z80.3    Mixed hyperlipidemia E78.2    TROY (obstructive sleep apnea) G47.33    Severe obesity (BMI >= 40) ([facility]) E66.01    Elevated fasting glucose R73.01    Other malaise and fatigue R53.81, R53.83    Urinary incontinence due to urethral sphincter incompetence N36.42, R32    Stroke (cerebrum) ([facility]) I63.9    History of cerebrovascular accident (CVA) with residual deficit I69.30    Degenerative joint disease involving multiple joints M15.9    Left pontine stroke ([address]) I63.9    Right hemiplegia ([address]) G81.91    Ex-smoker Z87.891    H/O: stroke Z86.73    Numbness and tingling of right upper and lower extremity R20.0, R20.2    Anxiety and

## 2023-12-08 NOTE — CARE COORDINATION
12/8/23, 11:57 AM EST  Discharge Planning Evaluation  Social work consult received, patient from [facility]. Patient/Family preference is to return to [facility]. The patient's current payor source at the facility is Medicaid. Medicare skilled days available: n/a  Medicare does the patient have a three midnight qualifying stay? Does not have Medicare  Insurance precert:  no  Spoke with Marcel at the facility. Patient bed hold: yes  Anticipated transport plan: ambulance  Patient's Healthcare Decision Maker: Legal Next of [address] [facility] with Shahla Ewelina this morning, she reports she has been at [facility] for a couple months, reports she was at [address] prior, pt has a hx of CVA, reports she had been working with therapies to try working on [facility] on her right side. Pt reports she has been using a lift for transport, uses a wheelchair. Pt reports she has 3 children, 2 live in [facility], one in [place]. SW did call daughter Juaquin Potts, she asked for SW to call back, as on the other line with pt. Readmission Risk Low 0-14, Mod 15-19), High > 20: No data recorded  Current PCP: TREY Morgan CNP  PCP verified by CM? Yes    Patient Orientation: Alert and Oriented    Patient Cognition: Alert  History Provided by: Patient, Medical Record, Other (see comment) Perla Esquivel)    Advance Directives:      Code Status: Full Code   Patient's Primary Decision Maker is: Legal Next of Kin       Discharge Planning:    Patient lives with:   Type of Home:    Primary Care Giver:  Other (Comment) (Samantha)  Patient Support Systems include: Children, Other (Comment) (Samantha)   Current Financial resources:    Current community resources:    Current services prior to admission:              Current DME:              Type of Home Care services:       ADLS  Prior functional level: Assistance with the following:, Bathing, Dressing, Toileting, Cooking, Housework, Shopping,

## 2023-12-08 NOTE — CARE COORDINATION
12/8/23, 1:23 PM [place] [address] [address]  Admitted: 12/7/2023  Hospital Day: 1    Location: -28/028-A Reason for admit: Dehydration [E86.0]  Hyponatremia [E87.1]  Urinary tract infection [N39.0]  Urinary tract infection without hematuria, site unspecified [N39.0]    Past Medical History:   Diagnosis Date    Arrhythmia     Chronic pain     Edema     GERD (gastroesophageal reflux disease)     Hypertension     Stroke (cerebrum) ([address]) 2019    Urticaria        Procedure:   12/7 CXR: No definite consolidation or pleural effusion. Mild bronchial wall thickening is nonspecific. 12/7 CT A/P:  Mild right hydroureteronephrosis with right perinephric and periureteric fat stranding consistent with ascending urinary tract infection and also consistent with the provided history of traumatic right ureteral stent removal.  12/7 Renal US: Focal left renal cortical thickening, likely scarring. Otherwise normal.      Barriers to Discharge: Pt presented w fevers, flank pain, abdominal pain and dysuria for 2 days. Recent uro stent placement, removed by pt 12/5. PMHx CVA 2019, HTN, anxiety, depression, insomnia, TROY w o2 at night, chronic pain, edema, GERD, urticaria, unspecified arrhythmia. Admitted by hospitalist. +UTI. Consult to urology. Tmax this admit 100.4. Now 98.3. Requiring 2L NC ATC (BL HS O2 only). IVF. IV Merrem. Urine and blood cultures sent. Na+ 127. PCP: Ros Srinivasan, APRN - CNP    Readmission Risk Low 0-14, Mod 15-19), High > 20: No data recorded    Advance Directives:      Code Status: Full Code   Patient's Primary Decision Maker is: Legal Next of Kin      Patient Goals/Plan/Treatment Preferences: Elham Bain is from the [place], and plans to return. SW on for dc planning. Transportation/Food Security/Housekeeping Addressed: No issues identified.      If patient is discharged prior to next notation, then this note serves as note for discharge by case

## 2023-12-08 NOTE — FLOWSHEET NOTE
12/08/23 1526   Treatment Team Notification   Reason for Communication Abnormal vitals   Name of Team Member Notified Dr. Shona Covington Team Role Resident   Method of Communication Secure Message   Response See orders   Notification Time 1500       Notified patient is requiring 2L nasal cannula. Was 86% on RA.

## 2023-12-08 NOTE — ED NOTES
Patient resting on cot. VS stable. Telemetry in place. Call light in reach. Patient denies any needs at this time.       Bertha Hendrix RN  12/07/23 5230

## 2023-12-08 NOTE — ED NOTES
Patient Daughter Veronica Moctezuma) called at this time asking for an update.  Patient daughter states she wanted to inform us the patient has been vomiting since Tuesday when they removed      Shyla Michel RN  12/07/23 1934

## 2023-12-09 LAB
ANION GAP SERPL CALC-SCNC: 8 MEQ/L (ref 8–16)
BACTERIA UR CULT: ABNORMAL
BUN SERPL-MCNC: 17 MG/DL (ref 7–22)
CALCIUM SERPL-MCNC: 8.2 MG/DL (ref 8.5–10.5)
CHLORIDE SERPL-SCNC: 92 MEQ/L (ref 98–111)
CO2 SERPL-SCNC: 29 MEQ/L (ref 23–33)
CREAT SERPL-MCNC: 0.7 MG/DL (ref 0.4–1.2)
DEPRECATED RDW RBC AUTO: 43.3 FL (ref 35–45)
ERYTHROCYTE [DISTWIDTH] IN BLOOD BY AUTOMATED COUNT: 13.3 % (ref 11.5–14.5)
GFR SERPL CREATININE-BSD FRML MDRD: > 60 ML/MIN/1.73M2
GLUCOSE SERPL-MCNC: 108 MG/DL (ref 70–108)
HCT VFR BLD AUTO: 33 % (ref 37–47)
HGB BLD-MCNC: 10.8 GM/DL (ref 12–16)
MAGNESIUM SERPL-MCNC: 2.2 MG/DL (ref 1.6–2.4)
MCH RBC QN AUTO: 29 PG (ref 26–33)
MCHC RBC AUTO-ENTMCNC: 32.7 GM/DL (ref 32.2–35.5)
MCV RBC AUTO: 88.5 FL (ref 81–99)
ORGANISM: ABNORMAL
PLATELET # BLD AUTO: 182 THOU/MM3 (ref 130–400)
PMV BLD AUTO: 9.8 FL (ref 9.4–12.4)
POTASSIUM SERPL-SCNC: 3.7 MEQ/L (ref 3.5–5.2)
RBC # BLD AUTO: 3.73 MILL/MM3 (ref 4.2–5.4)
SODIUM SERPL-SCNC: 129 MEQ/L (ref 135–145)
TSH SERPL DL<=0.005 MIU/L-ACNC: 1.51 UIU/ML (ref 0.4–4.2)
WBC # BLD AUTO: 7.1 THOU/MM3 (ref 4.8–10.8)

## 2023-12-09 PROCEDURE — 1200000000 HC SEMI PRIVATE

## 2023-12-09 PROCEDURE — 6370000000 HC RX 637 (ALT 250 FOR IP)

## 2023-12-09 PROCEDURE — 85027 COMPLETE CBC AUTOMATED: CPT

## 2023-12-09 PROCEDURE — 6360000002 HC RX W HCPCS

## 2023-12-09 PROCEDURE — 84443 ASSAY THYROID STIM HORMONE: CPT

## 2023-12-09 PROCEDURE — 94669 MECHANICAL CHEST WALL OSCILL: CPT

## 2023-12-09 PROCEDURE — 6360000002 HC RX W HCPCS: Performed by: STUDENT IN AN ORGANIZED HEALTH CARE EDUCATION/TRAINING PROGRAM

## 2023-12-09 PROCEDURE — 99233 SBSQ HOSP IP/OBS HIGH 50: CPT | Performed by: STUDENT IN AN ORGANIZED HEALTH CARE EDUCATION/TRAINING PROGRAM

## 2023-12-09 PROCEDURE — 36415 COLL VENOUS BLD VENIPUNCTURE: CPT

## 2023-12-09 PROCEDURE — 2580000003 HC RX 258

## 2023-12-09 PROCEDURE — 80048 BASIC METABOLIC PNL TOTAL CA: CPT

## 2023-12-09 PROCEDURE — 83735 ASSAY OF MAGNESIUM: CPT

## 2023-12-09 RX ORDER — ENOXAPARIN SODIUM 100 MG/ML
30 INJECTION SUBCUTANEOUS 2 TIMES DAILY
Status: DISCONTINUED | OUTPATIENT
Start: 2023-12-09 | End: 2023-12-13 | Stop reason: HOSPADM

## 2023-12-09 RX ORDER — FUROSEMIDE 10 MG/ML
40 INJECTION INTRAMUSCULAR; INTRAVENOUS ONCE
Status: COMPLETED | OUTPATIENT
Start: 2023-12-09 | End: 2023-12-09

## 2023-12-09 RX ADMIN — CEFEPIME 2000 MG: 2 INJECTION, POWDER, FOR SOLUTION INTRAVENOUS at 22:28

## 2023-12-09 RX ADMIN — GUAIFENESIN AND DEXTROMETHORPHAN 5 ML: 100; 10 SYRUP ORAL at 09:10

## 2023-12-09 RX ADMIN — CEFEPIME 2000 MG: 2 INJECTION, POWDER, FOR SOLUTION INTRAVENOUS at 10:32

## 2023-12-09 RX ADMIN — ATORVASTATIN CALCIUM 80 MG: 80 TABLET, FILM COATED ORAL at 09:06

## 2023-12-09 RX ADMIN — GUAIFENESIN AND DEXTROMETHORPHAN 5 ML: 100; 10 SYRUP ORAL at 23:53

## 2023-12-09 RX ADMIN — MEROPENEM 1000 MG: 1 INJECTION, POWDER, FOR SOLUTION INTRAVENOUS at 03:07

## 2023-12-09 RX ADMIN — FUROSEMIDE 40 MG: 10 INJECTION, SOLUTION INTRAMUSCULAR; INTRAVENOUS at 13:27

## 2023-12-09 RX ADMIN — SODIUM CHLORIDE, PRESERVATIVE FREE 10 ML: 5 INJECTION INTRAVENOUS at 21:03

## 2023-12-09 RX ADMIN — ACETAMINOPHEN 650 MG: 325 TABLET ORAL at 13:33

## 2023-12-09 RX ADMIN — HYDRALAZINE HYDROCHLORIDE 10 MG: 20 INJECTION, SOLUTION INTRAMUSCULAR; INTRAVENOUS at 22:25

## 2023-12-09 RX ADMIN — OXYBUTYNIN CHLORIDE 10 MG: 10 TABLET, EXTENDED RELEASE ORAL at 09:06

## 2023-12-09 RX ADMIN — AMANTADINE 100 MG: 100 CAPSULE ORAL at 21:02

## 2023-12-09 RX ADMIN — GUAIFENESIN AND DEXTROMETHORPHAN 5 ML: 100; 10 SYRUP ORAL at 13:33

## 2023-12-09 RX ADMIN — SERTRALINE 25 MG: 25 TABLET, FILM COATED ORAL at 09:06

## 2023-12-09 RX ADMIN — ACETAMINOPHEN 650 MG: 325 TABLET ORAL at 22:24

## 2023-12-09 RX ADMIN — AMANTADINE 100 MG: 100 CAPSULE ORAL at 09:06

## 2023-12-09 RX ADMIN — GUAIFENESIN AND DEXTROMETHORPHAN 5 ML: 100; 10 SYRUP ORAL at 19:25

## 2023-12-09 RX ADMIN — ENOXAPARIN SODIUM 30 MG: 100 INJECTION SUBCUTANEOUS at 21:02

## 2023-12-09 ASSESSMENT — PAIN SCALES - GENERAL
PAINLEVEL_OUTOF10: 5
PAINLEVEL_OUTOF10: 4
PAINLEVEL_OUTOF10: 2
PAINLEVEL_OUTOF10: 6

## 2023-12-09 ASSESSMENT — PAIN DESCRIPTION - LOCATION
LOCATION: OTHER (COMMENT)
LOCATION: HEAD

## 2023-12-09 ASSESSMENT — PAIN DESCRIPTION - ORIENTATION: ORIENTATION: OTHER (COMMENT)

## 2023-12-09 ASSESSMENT — PAIN DESCRIPTION - ONSET: ONSET: ON-GOING

## 2023-12-09 ASSESSMENT — PAIN - FUNCTIONAL ASSESSMENT
PAIN_FUNCTIONAL_ASSESSMENT: ACTIVITIES ARE NOT PREVENTED
PAIN_FUNCTIONAL_ASSESSMENT: PREVENTS OR INTERFERES SOME ACTIVE ACTIVITIES AND ADLS

## 2023-12-09 ASSESSMENT — PAIN DESCRIPTION - PAIN TYPE: TYPE: ACUTE PAIN

## 2023-12-09 ASSESSMENT — PAIN DESCRIPTION - DESCRIPTORS
DESCRIPTORS: ACHING
DESCRIPTORS: ACHING

## 2023-12-09 ASSESSMENT — PAIN DESCRIPTION - FREQUENCY: FREQUENCY: INTERMITTENT

## 2023-12-09 NOTE — H&P
NEGATIVE     Specific Gravity, Urine 1.024 1.002 - 1.030     Blood, Urine LARGE (A) NEGATIVE     pH, UA 5.5 5.0 - 9.0     Protein,  (A) NEGATIVE     Urobilinogen, Urine 1.0 0.0 - 1.0 eu/dl     Nitrite, Urine POSITIVE (A) NEGATIVE     Leukocyte Esterase, Urine LARGE (A) NEGATIVE     Color, UA DK YELLOW (A) STRAW-YELLOW     Character, Urine TURBID (A) CLEAR-SL CLOUD     RBC, UA > 100 0-2/hpf /hpf     WBC, UA > 200 0-4/hpf /hpf     Epithelial Cells, UA 0-2 3-5/hpf /hpf     Bacteria, UA MANY FEW/NONE SEEN /hpf     Casts UA 8-15 HYALINE NONE SEEN /lpf     Crystals, UA NONE SEEN NONE SEEN     Renal Epithelial, UA NONE SEEN NONE SEEN     Yeast, UA NONE SEEN NONE SEEN     CASTS 2 4-8 FINE GRAN NONE SEEN /lpf     MISCELLANEOUS 2 NONE SEEN     EKG Infection   Result Value Ref Range     Ventricular Rate 103 BPM     Atrial Rate 103 BPM     P-R Interval 130 ms     QRS Duration 102 ms     Q-T Interval 356 ms     QTc Calculation (Bazett) 466 ms     P Axis 48 degrees     R Axis 8 degrees     T Axis 38 degrees         Diagnostics:  XR CHEST PORTABLE     Result Date: 12/7/2023  Single view of the chest. Findings: Body habitus limits the study. The heart is enlarged. Mild elevation of the right hemidiaphragm. Impression: No definite consolidation or pleural effusion. Mild bronchial wall thickening is nonspecific. This document has been electronically signed by: Katelynn Aguilar MD on 12/07/2023 07:44 PM        EKG:   As above     Micro:  Blood cultures and urine cultures ordered. Pending results. UA showing many bacteria along with positive nitrites, leukocytes, WBCs. Electronically Signed by  Amrit Pop MBA  PGY-1 Internal Medicine Resident  [address] [place] [address]  On 12/7/2023 at 9:24 PM     Staff: Dr. Enrike Bran MD     This report has been created using voice recognition software.  It may contain minor errors which are inherent in voice recognition technology

## 2023-12-10 ENCOUNTER — APPOINTMENT (OUTPATIENT)
Dept: CT IMAGING | Age: 63
DRG: 871 | End: 2023-12-10
Payer: MEDICARE

## 2023-12-10 LAB
ANION GAP SERPL CALC-SCNC: 6 MEQ/L (ref 8–16)
BUN SERPL-MCNC: 13 MG/DL (ref 7–22)
CALCIUM SERPL-MCNC: 8.3 MG/DL (ref 8.5–10.5)
CHLORIDE SERPL-SCNC: 90 MEQ/L (ref 98–111)
CO2 SERPL-SCNC: 34 MEQ/L (ref 23–33)
CREAT SERPL-MCNC: 0.6 MG/DL (ref 0.4–1.2)
DEPRECATED RDW RBC AUTO: 42.5 FL (ref 35–45)
ERYTHROCYTE [DISTWIDTH] IN BLOOD BY AUTOMATED COUNT: 13.2 % (ref 11.5–14.5)
GFR SERPL CREATININE-BSD FRML MDRD: > 60 ML/MIN/1.73M2
GLUCOSE SERPL-MCNC: 116 MG/DL (ref 70–108)
HCT VFR BLD AUTO: 34.4 % (ref 37–47)
HGB BLD-MCNC: 11.1 GM/DL (ref 12–16)
MAGNESIUM SERPL-MCNC: 2 MG/DL (ref 1.6–2.4)
MCH RBC QN AUTO: 28.3 PG (ref 26–33)
MCHC RBC AUTO-ENTMCNC: 32.3 GM/DL (ref 32.2–35.5)
MCV RBC AUTO: 87.8 FL (ref 81–99)
PLATELET # BLD AUTO: 196 THOU/MM3 (ref 130–400)
PMV BLD AUTO: 9.9 FL (ref 9.4–12.4)
POTASSIUM SERPL-SCNC: 3.7 MEQ/L (ref 3.5–5.2)
RBC # BLD AUTO: 3.92 MILL/MM3 (ref 4.2–5.4)
SODIUM SERPL-SCNC: 130 MEQ/L (ref 135–145)
WBC # BLD AUTO: 6.7 THOU/MM3 (ref 4.8–10.8)

## 2023-12-10 PROCEDURE — 70450 CT HEAD/BRAIN W/O DYE: CPT

## 2023-12-10 PROCEDURE — 94761 N-INVAS EAR/PLS OXIMETRY MLT: CPT

## 2023-12-10 PROCEDURE — 6370000000 HC RX 637 (ALT 250 FOR IP)

## 2023-12-10 PROCEDURE — 1200000000 HC SEMI PRIVATE

## 2023-12-10 PROCEDURE — 2580000003 HC RX 258

## 2023-12-10 PROCEDURE — 80048 BASIC METABOLIC PNL TOTAL CA: CPT

## 2023-12-10 PROCEDURE — 99232 SBSQ HOSP IP/OBS MODERATE 35: CPT | Performed by: STUDENT IN AN ORGANIZED HEALTH CARE EDUCATION/TRAINING PROGRAM

## 2023-12-10 PROCEDURE — 36415 COLL VENOUS BLD VENIPUNCTURE: CPT

## 2023-12-10 PROCEDURE — 6360000002 HC RX W HCPCS: Performed by: STUDENT IN AN ORGANIZED HEALTH CARE EDUCATION/TRAINING PROGRAM

## 2023-12-10 PROCEDURE — 85027 COMPLETE CBC AUTOMATED: CPT

## 2023-12-10 PROCEDURE — 6360000002 HC RX W HCPCS

## 2023-12-10 PROCEDURE — 6370000000 HC RX 637 (ALT 250 FOR IP): Performed by: STUDENT IN AN ORGANIZED HEALTH CARE EDUCATION/TRAINING PROGRAM

## 2023-12-10 PROCEDURE — 83735 ASSAY OF MAGNESIUM: CPT

## 2023-12-10 RX ORDER — DIPHENHYDRAMINE HYDROCHLORIDE 50 MG/ML
25 INJECTION INTRAMUSCULAR; INTRAVENOUS ONCE
Status: COMPLETED | OUTPATIENT
Start: 2023-12-10 | End: 2023-12-10

## 2023-12-10 RX ORDER — POTASSIUM CHLORIDE 20 MEQ/1
40 TABLET, EXTENDED RELEASE ORAL ONCE
Status: COMPLETED | OUTPATIENT
Start: 2023-12-10 | End: 2023-12-10

## 2023-12-10 RX ORDER — KETOROLAC TROMETHAMINE 30 MG/ML
30 INJECTION, SOLUTION INTRAMUSCULAR; INTRAVENOUS EVERY 6 HOURS PRN
Status: DISCONTINUED | OUTPATIENT
Start: 2023-12-10 | End: 2023-12-13 | Stop reason: HOSPADM

## 2023-12-10 RX ORDER — CARVEDILOL 3.12 MG/1
3.12 TABLET ORAL 2 TIMES DAILY WITH MEALS
Status: DISCONTINUED | OUTPATIENT
Start: 2023-12-10 | End: 2023-12-11

## 2023-12-10 RX ADMIN — DIPHENHYDRAMINE HYDROCHLORIDE 25 MG: 50 INJECTION, SOLUTION INTRAMUSCULAR; INTRAVENOUS at 20:43

## 2023-12-10 RX ADMIN — GUAIFENESIN AND DEXTROMETHORPHAN 5 ML: 100; 10 SYRUP ORAL at 09:20

## 2023-12-10 RX ADMIN — CARVEDILOL 3.12 MG: 3.12 TABLET, FILM COATED ORAL at 20:43

## 2023-12-10 RX ADMIN — AMANTADINE 100 MG: 100 CAPSULE ORAL at 09:21

## 2023-12-10 RX ADMIN — ACETAMINOPHEN 650 MG: 325 TABLET ORAL at 20:48

## 2023-12-10 RX ADMIN — AMANTADINE 100 MG: 100 CAPSULE ORAL at 20:43

## 2023-12-10 RX ADMIN — SODIUM CHLORIDE, PRESERVATIVE FREE 10 ML: 5 INJECTION INTRAVENOUS at 09:22

## 2023-12-10 RX ADMIN — CEFEPIME 2000 MG: 2 INJECTION, POWDER, FOR SOLUTION INTRAVENOUS at 12:22

## 2023-12-10 RX ADMIN — OXYBUTYNIN CHLORIDE 10 MG: 10 TABLET, EXTENDED RELEASE ORAL at 09:22

## 2023-12-10 RX ADMIN — SERTRALINE 25 MG: 25 TABLET, FILM COATED ORAL at 09:22

## 2023-12-10 RX ADMIN — ACETAMINOPHEN 650 MG: 325 TABLET ORAL at 09:20

## 2023-12-10 RX ADMIN — ENOXAPARIN SODIUM 30 MG: 100 INJECTION SUBCUTANEOUS at 20:43

## 2023-12-10 RX ADMIN — POTASSIUM CHLORIDE 40 MEQ: 1500 TABLET, EXTENDED RELEASE ORAL at 12:19

## 2023-12-10 RX ADMIN — ASPIRIN 81 MG: 81 TABLET, CHEWABLE ORAL at 09:22

## 2023-12-10 RX ADMIN — SODIUM CHLORIDE, PRESERVATIVE FREE 10 ML: 5 INJECTION INTRAVENOUS at 20:50

## 2023-12-10 RX ADMIN — ENOXAPARIN SODIUM 30 MG: 100 INJECTION SUBCUTANEOUS at 09:21

## 2023-12-10 RX ADMIN — POLYETHYLENE GLYCOL 3350 17 G: 17 POWDER, FOR SOLUTION ORAL at 20:56

## 2023-12-10 RX ADMIN — CEFEPIME 2000 MG: 2 INJECTION, POWDER, FOR SOLUTION INTRAVENOUS at 23:01

## 2023-12-10 RX ADMIN — CLOPIDOGREL BISULFATE 75 MG: 75 TABLET ORAL at 09:20

## 2023-12-10 RX ADMIN — ATORVASTATIN CALCIUM 80 MG: 80 TABLET, FILM COATED ORAL at 09:22

## 2023-12-10 RX ADMIN — GUAIFENESIN AND DEXTROMETHORPHAN 5 ML: 100; 10 SYRUP ORAL at 16:51

## 2023-12-10 ASSESSMENT — PAIN DESCRIPTION - DESCRIPTORS
DESCRIPTORS: ACHING
DESCRIPTORS: ACHING

## 2023-12-10 ASSESSMENT — PAIN SCALES - GENERAL
PAINLEVEL_OUTOF10: 5
PAINLEVEL_OUTOF10: 4
PAINLEVEL_OUTOF10: 0

## 2023-12-10 ASSESSMENT — PAIN DESCRIPTION - LOCATION
LOCATION: HEAD
LOCATION: HEAD

## 2023-12-10 ASSESSMENT — PAIN - FUNCTIONAL ASSESSMENT
PAIN_FUNCTIONAL_ASSESSMENT: PREVENTS OR INTERFERES SOME ACTIVE ACTIVITIES AND ADLS
PAIN_FUNCTIONAL_ASSESSMENT: PREVENTS OR INTERFERES SOME ACTIVE ACTIVITIES AND ADLS

## 2023-12-10 ASSESSMENT — PAIN DESCRIPTION - ORIENTATION
ORIENTATION: MID
ORIENTATION: MID

## 2023-12-11 ENCOUNTER — APPOINTMENT (OUTPATIENT)
Dept: MRI IMAGING | Age: 63
DRG: 871 | End: 2023-12-11
Payer: MEDICARE

## 2023-12-11 LAB
ANION GAP SERPL CALC-SCNC: 10 MEQ/L (ref 8–16)
BUN SERPL-MCNC: 11 MG/DL (ref 7–22)
CALCIUM SERPL-MCNC: 9.1 MG/DL (ref 8.5–10.5)
CHLORIDE SERPL-SCNC: 95 MEQ/L (ref 98–111)
CO2 SERPL-SCNC: 32 MEQ/L (ref 23–33)
CREAT SERPL-MCNC: 0.6 MG/DL (ref 0.4–1.2)
DEPRECATED RDW RBC AUTO: 43 FL (ref 35–45)
ERYTHROCYTE [DISTWIDTH] IN BLOOD BY AUTOMATED COUNT: 13.2 % (ref 11.5–14.5)
GFR SERPL CREATININE-BSD FRML MDRD: > 60 ML/MIN/1.73M2
GLUCOSE SERPL-MCNC: 101 MG/DL (ref 70–108)
HCT VFR BLD AUTO: 36.6 % (ref 37–47)
HGB BLD-MCNC: 11.8 GM/DL (ref 12–16)
MAGNESIUM SERPL-MCNC: 2 MG/DL (ref 1.6–2.4)
MCH RBC QN AUTO: 28.4 PG (ref 26–33)
MCHC RBC AUTO-ENTMCNC: 32.2 GM/DL (ref 32.2–35.5)
MCV RBC AUTO: 88.2 FL (ref 81–99)
PLATELET # BLD AUTO: 224 THOU/MM3 (ref 130–400)
PMV BLD AUTO: 9.8 FL (ref 9.4–12.4)
POTASSIUM SERPL-SCNC: 4 MEQ/L (ref 3.5–5.2)
RBC # BLD AUTO: 4.15 MILL/MM3 (ref 4.2–5.4)
SODIUM SERPL-SCNC: 137 MEQ/L (ref 135–145)
WBC # BLD AUTO: 5.9 THOU/MM3 (ref 4.8–10.8)

## 2023-12-11 PROCEDURE — 85027 COMPLETE CBC AUTOMATED: CPT

## 2023-12-11 PROCEDURE — 6360000002 HC RX W HCPCS

## 2023-12-11 PROCEDURE — 99232 SBSQ HOSP IP/OBS MODERATE 35: CPT | Performed by: STUDENT IN AN ORGANIZED HEALTH CARE EDUCATION/TRAINING PROGRAM

## 2023-12-11 PROCEDURE — 6370000000 HC RX 637 (ALT 250 FOR IP)

## 2023-12-11 PROCEDURE — 2580000003 HC RX 258

## 2023-12-11 PROCEDURE — 97110 THERAPEUTIC EXERCISES: CPT

## 2023-12-11 PROCEDURE — 6370000000 HC RX 637 (ALT 250 FOR IP): Performed by: STUDENT IN AN ORGANIZED HEALTH CARE EDUCATION/TRAINING PROGRAM

## 2023-12-11 PROCEDURE — 83735 ASSAY OF MAGNESIUM: CPT

## 2023-12-11 PROCEDURE — 97162 PT EVAL MOD COMPLEX 30 MIN: CPT

## 2023-12-11 PROCEDURE — 1200000000 HC SEMI PRIVATE

## 2023-12-11 PROCEDURE — 6360000002 HC RX W HCPCS: Performed by: STUDENT IN AN ORGANIZED HEALTH CARE EDUCATION/TRAINING PROGRAM

## 2023-12-11 PROCEDURE — A9579 GAD-BASE MR CONTRAST NOS,1ML: HCPCS | Performed by: STUDENT IN AN ORGANIZED HEALTH CARE EDUCATION/TRAINING PROGRAM

## 2023-12-11 PROCEDURE — 70553 MRI BRAIN STEM W/O & W/DYE: CPT

## 2023-12-11 PROCEDURE — 36415 COLL VENOUS BLD VENIPUNCTURE: CPT

## 2023-12-11 PROCEDURE — 80048 BASIC METABOLIC PNL TOTAL CA: CPT

## 2023-12-11 PROCEDURE — 6360000004 HC RX CONTRAST MEDICATION: Performed by: STUDENT IN AN ORGANIZED HEALTH CARE EDUCATION/TRAINING PROGRAM

## 2023-12-11 RX ORDER — POLYETHYLENE GLYCOL 3350 17 G/17G
17 POWDER, FOR SOLUTION ORAL DAILY
Status: DISCONTINUED | OUTPATIENT
Start: 2023-12-11 | End: 2023-12-13 | Stop reason: HOSPADM

## 2023-12-11 RX ORDER — CARVEDILOL 6.25 MG/1
6.25 TABLET ORAL 2 TIMES DAILY WITH MEALS
Status: DISCONTINUED | OUTPATIENT
Start: 2023-12-11 | End: 2023-12-12

## 2023-12-11 RX ORDER — CIPROFLOXACIN 500 MG/1
500 TABLET, FILM COATED ORAL EVERY 12 HOURS SCHEDULED
Status: DISCONTINUED | OUTPATIENT
Start: 2023-12-11 | End: 2023-12-13 | Stop reason: HOSPADM

## 2023-12-11 RX ORDER — HYDRALAZINE HYDROCHLORIDE 25 MG/1
25 TABLET, FILM COATED ORAL 3 TIMES DAILY
Status: DISCONTINUED | OUTPATIENT
Start: 2023-12-11 | End: 2023-12-12

## 2023-12-11 RX ADMIN — HYDRALAZINE HYDROCHLORIDE 25 MG: 25 TABLET, FILM COATED ORAL at 21:29

## 2023-12-11 RX ADMIN — ENOXAPARIN SODIUM 30 MG: 100 INJECTION SUBCUTANEOUS at 21:30

## 2023-12-11 RX ADMIN — GUAIFENESIN AND DEXTROMETHORPHAN 5 ML: 100; 10 SYRUP ORAL at 08:42

## 2023-12-11 RX ADMIN — ASPIRIN 81 MG: 81 TABLET, CHEWABLE ORAL at 08:52

## 2023-12-11 RX ADMIN — CLOPIDOGREL BISULFATE 75 MG: 75 TABLET ORAL at 08:46

## 2023-12-11 RX ADMIN — HYDRALAZINE HYDROCHLORIDE 10 MG: 20 INJECTION, SOLUTION INTRAMUSCULAR; INTRAVENOUS at 03:56

## 2023-12-11 RX ADMIN — CARVEDILOL 3.12 MG: 3.12 TABLET, FILM COATED ORAL at 08:46

## 2023-12-11 RX ADMIN — SODIUM CHLORIDE, PRESERVATIVE FREE 10 ML: 5 INJECTION INTRAVENOUS at 08:52

## 2023-12-11 RX ADMIN — SODIUM CHLORIDE, PRESERVATIVE FREE 10 ML: 5 INJECTION INTRAVENOUS at 21:30

## 2023-12-11 RX ADMIN — HYDRALAZINE HYDROCHLORIDE 25 MG: 25 TABLET, FILM COATED ORAL at 14:34

## 2023-12-11 RX ADMIN — SERTRALINE 25 MG: 25 TABLET, FILM COATED ORAL at 08:46

## 2023-12-11 RX ADMIN — POLYETHYLENE GLYCOL 3350 17 G: 17 POWDER, FOR SOLUTION ORAL at 14:33

## 2023-12-11 RX ADMIN — AMANTADINE 100 MG: 100 CAPSULE ORAL at 21:29

## 2023-12-11 RX ADMIN — OXYBUTYNIN CHLORIDE 10 MG: 10 TABLET, EXTENDED RELEASE ORAL at 08:46

## 2023-12-11 RX ADMIN — ENOXAPARIN SODIUM 30 MG: 100 INJECTION SUBCUTANEOUS at 08:47

## 2023-12-11 RX ADMIN — AMANTADINE 100 MG: 100 CAPSULE ORAL at 08:46

## 2023-12-11 RX ADMIN — GUAIFENESIN AND DEXTROMETHORPHAN 5 ML: 100; 10 SYRUP ORAL at 21:34

## 2023-12-11 RX ADMIN — ATORVASTATIN CALCIUM 80 MG: 80 TABLET, FILM COATED ORAL at 08:46

## 2023-12-11 RX ADMIN — CARVEDILOL 6.25 MG: 6.25 TABLET, FILM COATED ORAL at 16:59

## 2023-12-11 RX ADMIN — CEFEPIME 2000 MG: 2 INJECTION, POWDER, FOR SOLUTION INTRAVENOUS at 12:10

## 2023-12-11 RX ADMIN — GADOTERIDOL 20 ML: 279.3 INJECTION, SOLUTION INTRAVENOUS at 11:32

## 2023-12-11 RX ADMIN — CIPROFLOXACIN 500 MG: 500 TABLET, FILM COATED ORAL at 21:29

## 2023-12-11 ASSESSMENT — PAIN SCALES - GENERAL: PAINLEVEL_OUTOF10: 4

## 2023-12-11 NOTE — CARE COORDINATION
12/11/23, 4:11 PM EST    DISCHARGE ON [address] day: 4  Location: [facility]28/028-A Reason for admit: Dehydration [E86.0]  Hyponatremia [E87.1]  Urinary tract infection [N39.0]  Urinary tract infection without hematuria, site unspecified [N39.0]   Barriers to Discharge: Cipro to oral route, Lovenox, prn Tylenol, Toradol, and Zofran, BMP, Magnesium, and CBC in a.m., regular diet, incentive spirometry, SCD's, PT/OT, up with assistance. PCP: TREY Omalley CNP  Readmission Risk Score: 15.7%  Patient Goals/Plan/Treatment Preferences: Karyle Blocker is from [facility]. She is planned to return at discharge.

## 2023-12-12 LAB
ANION GAP SERPL CALC-SCNC: 16 MEQ/L (ref 8–16)
BACTERIA BLD AEROBE CULT: NORMAL
BACTERIA BLD AEROBE CULT: NORMAL
BUN SERPL-MCNC: 10 MG/DL (ref 7–22)
CALCIUM SERPL-MCNC: 8.7 MG/DL (ref 8.5–10.5)
CHLORIDE SERPL-SCNC: 96 MEQ/L (ref 98–111)
CO2 SERPL-SCNC: 23 MEQ/L (ref 23–33)
CREAT SERPL-MCNC: 0.5 MG/DL (ref 0.4–1.2)
DEPRECATED RDW RBC AUTO: 49 FL (ref 35–45)
ERYTHROCYTE [DISTWIDTH] IN BLOOD BY AUTOMATED COUNT: 13.6 % (ref 11.5–14.5)
GFR SERPL CREATININE-BSD FRML MDRD: > 60 ML/MIN/1.73M2
GLUCOSE SERPL-MCNC: 94 MG/DL (ref 70–108)
HCT VFR BLD AUTO: 41.7 % (ref 37–47)
HGB BLD-MCNC: 12.4 GM/DL (ref 12–16)
MAGNESIUM SERPL-MCNC: 2.1 MG/DL (ref 1.6–2.4)
MCH RBC QN AUTO: 29.1 PG (ref 26–33)
MCHC RBC AUTO-ENTMCNC: 29.7 GM/DL (ref 32.2–35.5)
MCV RBC AUTO: 97.9 FL (ref 81–99)
PLATELET # BLD AUTO: 230 THOU/MM3 (ref 130–400)
PMV BLD AUTO: 9.7 FL (ref 9.4–12.4)
POTASSIUM SERPL-SCNC: 3.8 MEQ/L (ref 3.5–5.2)
RBC # BLD AUTO: 4.26 MILL/MM3 (ref 4.2–5.4)
SODIUM SERPL-SCNC: 135 MEQ/L (ref 135–145)
WBC # BLD AUTO: 6.8 THOU/MM3 (ref 4.8–10.8)

## 2023-12-12 PROCEDURE — 6370000000 HC RX 637 (ALT 250 FOR IP): Performed by: STUDENT IN AN ORGANIZED HEALTH CARE EDUCATION/TRAINING PROGRAM

## 2023-12-12 PROCEDURE — 80048 BASIC METABOLIC PNL TOTAL CA: CPT

## 2023-12-12 PROCEDURE — 6370000000 HC RX 637 (ALT 250 FOR IP)

## 2023-12-12 PROCEDURE — 85027 COMPLETE CBC AUTOMATED: CPT

## 2023-12-12 PROCEDURE — 97110 THERAPEUTIC EXERCISES: CPT

## 2023-12-12 PROCEDURE — 2580000003 HC RX 258

## 2023-12-12 PROCEDURE — 99232 SBSQ HOSP IP/OBS MODERATE 35: CPT | Performed by: PHYSICIAN ASSISTANT

## 2023-12-12 PROCEDURE — 36415 COLL VENOUS BLD VENIPUNCTURE: CPT

## 2023-12-12 PROCEDURE — 83735 ASSAY OF MAGNESIUM: CPT

## 2023-12-12 PROCEDURE — 1200000000 HC SEMI PRIVATE

## 2023-12-12 PROCEDURE — 6360000002 HC RX W HCPCS: Performed by: STUDENT IN AN ORGANIZED HEALTH CARE EDUCATION/TRAINING PROGRAM

## 2023-12-12 RX ORDER — CARVEDILOL 6.25 MG/1
6.25 TABLET ORAL ONCE
Status: COMPLETED | OUTPATIENT
Start: 2023-12-12 | End: 2023-12-12

## 2023-12-12 RX ORDER — SODIUM CHLORIDE 9 MG/ML
INJECTION, SOLUTION INTRAVENOUS PRN
Status: DISCONTINUED | OUTPATIENT
Start: 2023-12-12 | End: 2023-12-13 | Stop reason: HOSPADM

## 2023-12-12 RX ORDER — SODIUM CHLORIDE 0.9 % (FLUSH) 0.9 %
5-40 SYRINGE (ML) INJECTION EVERY 12 HOURS SCHEDULED
Status: DISCONTINUED | OUTPATIENT
Start: 2023-12-12 | End: 2023-12-13 | Stop reason: HOSPADM

## 2023-12-12 RX ORDER — SODIUM CHLORIDE 0.9 % (FLUSH) 0.9 %
5-40 SYRINGE (ML) INJECTION PRN
Status: DISCONTINUED | OUTPATIENT
Start: 2023-12-12 | End: 2023-12-13 | Stop reason: HOSPADM

## 2023-12-12 RX ORDER — HYDRALAZINE HYDROCHLORIDE 25 MG/1
25 TABLET, FILM COATED ORAL ONCE
Status: COMPLETED | OUTPATIENT
Start: 2023-12-12 | End: 2023-12-12

## 2023-12-12 RX ORDER — CARVEDILOL 6.25 MG/1
12.5 TABLET ORAL 2 TIMES DAILY WITH MEALS
Status: DISCONTINUED | OUTPATIENT
Start: 2023-12-12 | End: 2023-12-13 | Stop reason: HOSPADM

## 2023-12-12 RX ORDER — HYDRALAZINE HYDROCHLORIDE 50 MG/1
50 TABLET, FILM COATED ORAL 3 TIMES DAILY
Status: DISCONTINUED | OUTPATIENT
Start: 2023-12-12 | End: 2023-12-13 | Stop reason: HOSPADM

## 2023-12-12 RX ADMIN — ATORVASTATIN CALCIUM 80 MG: 80 TABLET, FILM COATED ORAL at 09:01

## 2023-12-12 RX ADMIN — SODIUM CHLORIDE, PRESERVATIVE FREE 10 ML: 5 INJECTION INTRAVENOUS at 20:27

## 2023-12-12 RX ADMIN — Medication 3 MG: at 20:33

## 2023-12-12 RX ADMIN — HYDRALAZINE HYDROCHLORIDE 25 MG: 25 TABLET, FILM COATED ORAL at 10:53

## 2023-12-12 RX ADMIN — ASPIRIN 81 MG: 81 TABLET, CHEWABLE ORAL at 09:01

## 2023-12-12 RX ADMIN — ENOXAPARIN SODIUM 30 MG: 100 INJECTION SUBCUTANEOUS at 09:02

## 2023-12-12 RX ADMIN — CARVEDILOL 6.25 MG: 6.25 TABLET, FILM COATED ORAL at 10:52

## 2023-12-12 RX ADMIN — SODIUM CHLORIDE, PRESERVATIVE FREE 10 ML: 5 INJECTION INTRAVENOUS at 09:02

## 2023-12-12 RX ADMIN — OXYBUTYNIN CHLORIDE 10 MG: 10 TABLET, EXTENDED RELEASE ORAL at 09:01

## 2023-12-12 RX ADMIN — HYDRALAZINE HYDROCHLORIDE 25 MG: 25 TABLET, FILM COATED ORAL at 09:00

## 2023-12-12 RX ADMIN — AMANTADINE 100 MG: 100 CAPSULE ORAL at 20:27

## 2023-12-12 RX ADMIN — SERTRALINE 25 MG: 25 TABLET, FILM COATED ORAL at 09:01

## 2023-12-12 RX ADMIN — CIPROFLOXACIN 500 MG: 500 TABLET, FILM COATED ORAL at 20:27

## 2023-12-12 RX ADMIN — CLOPIDOGREL BISULFATE 75 MG: 75 TABLET ORAL at 09:01

## 2023-12-12 RX ADMIN — ACETAMINOPHEN 650 MG: 325 TABLET ORAL at 09:04

## 2023-12-12 RX ADMIN — POLYETHYLENE GLYCOL 3350 17 G: 17 POWDER, FOR SOLUTION ORAL at 09:01

## 2023-12-12 RX ADMIN — AMANTADINE 100 MG: 100 CAPSULE ORAL at 09:01

## 2023-12-12 RX ADMIN — CARVEDILOL 12.5 MG: 6.25 TABLET, FILM COATED ORAL at 17:14

## 2023-12-12 RX ADMIN — GUAIFENESIN AND DEXTROMETHORPHAN 5 ML: 100; 10 SYRUP ORAL at 14:37

## 2023-12-12 RX ADMIN — CIPROFLOXACIN 500 MG: 500 TABLET, FILM COATED ORAL at 09:01

## 2023-12-12 RX ADMIN — HYDRALAZINE HYDROCHLORIDE 50 MG: 50 TABLET, FILM COATED ORAL at 14:06

## 2023-12-12 RX ADMIN — GUAIFENESIN AND DEXTROMETHORPHAN 5 ML: 100; 10 SYRUP ORAL at 09:04

## 2023-12-12 RX ADMIN — CARVEDILOL 6.25 MG: 6.25 TABLET, FILM COATED ORAL at 09:01

## 2023-12-12 ASSESSMENT — PAIN DESCRIPTION - DESCRIPTORS: DESCRIPTORS: ACHING

## 2023-12-12 ASSESSMENT — PAIN SCALES - GENERAL
PAINLEVEL_OUTOF10: 3
PAINLEVEL_OUTOF10: 0
PAINLEVEL_OUTOF10: 3

## 2023-12-12 ASSESSMENT — PAIN DESCRIPTION - LOCATION: LOCATION: GENERALIZED

## 2023-12-12 ASSESSMENT — PAIN - FUNCTIONAL ASSESSMENT: PAIN_FUNCTIONAL_ASSESSMENT: ACTIVITIES ARE NOT PREVENTED

## 2023-12-12 NOTE — CONSULTS
Allergies   Allergen Reactions    Latex Rash    Amoxicillin     Ciprofloxacin     Codeine     Demerol Hcl [Meperidine]     Docusate Hives    Metoprolol Succinate [Metoprolol]     Norvasc [Amlodipine]     Omeprazole     Prednisone     Propofol     Senna     Sulfa Antibiotics     Tomato     Ultram [Tramadol Hcl]     Iv [Iodides] Rash     Pt states got hot spots with rash. Lisinopril Rash    Losartan Rash    Z-Randall [Azithromycin] Anxiety       Social History     Tobacco Use    Smoking status: Former     Packs/day: 1.00     Years: 10.00     Additional pack years: 0.00     Total pack years: 10.00     Types: Cigarettes     Start date: 0     Quit date:      Years since quittin.9    Smokeless tobacco: Never   Vaping Use    Vaping Use: Never used   Substance Use Topics    Alcohol use: No     Alcohol/week: 0.0 standard drinks of alcohol    Drug use: No       Family History   Problem Relation Age of Onset    High Blood Pressure Mother     Arthritis Sister     Cancer Brother         colon cancer    Other Daughter         bipolar disorder       Review of Systems  minimal No problems with ears, nose or throat. No problems with eyes. No chest pain or shortness of breath. Minimal abdominal pain. Reports some back pain but mostly on the left. No extremity pain or weakness, and no neurological deficits. No rashes. No swollen glands or lymph nodes. No bleeding disorders. No confusion, hallucinations or seizures.  symptoms per HPI. The remainder of the review of symptoms is negative. Physical Exam  Nursing note and vitals reviewed. Constitutional: Vital signs are normal. she appears well-developed and well-nourished. she is cooperative. HENT:   Head: Normocephalic and atraumatic. Mouth/Throat: Mucous membranes are normal.   Eyes: EOM are normal. No scleral icterus. Neck: Trachea normal. Neck supple. No tracheal deviation present.    Cardiovascular: Normal rate, regular rhythm, normal heart
Normocephalic and atraumatic. Mouth/Throat:      Mouth: Mucous membranes are moist.      Pharynx: Oropharynx is clear. Eyes:      Extraocular Movements: Extraocular movements intact. Pupils: Pupils are equal, round, and reactive to light. Pulmonary:      Effort: Pulmonary effort is normal.   Abdominal:      Palpations: Abdomen is soft. Neurological:      Mental Status: She is alert and oriented to person, place, and time. Mental status is at baseline. Comments: Right arm and leg weakness        Neurologic Exam     Mental Status   Oriented to person, place, and time. Cranial Nerves     CN III, IV, VI   Pupils are equal, round, and reactive to light. Labs/Imaging/Diagnostics   Labs:  CBC:  Recent Labs     12/10/23  0443 12/11/23  0539 12/12/23  0558   WBC 6.7 5.9 6.8   RBC 3.92* 4.15* 4.26   HGB 11.1* 11.8* 12.4   HCT 34.4* 36.6* 41.7   MCV 87.8 88.2 97.9    224 230     CHEMISTRIES:  Recent Labs     12/10/23  0443 12/11/23  0539 12/12/23  0558   * 137 135   K 3.7 4.0 3.8   CL 90* 95* 96*   CO2 34* 32 23   BUN 13 11 10   CREATININE 0.6 0.6 0.5   GLUCOSE 116* 101 94   MG 2.0 2.0 2.1     COAGULATION STUDIES:No results for input(s): \"PROTIME\", \"INR\", \"APTT\" in the last 72 hours. LIVER PROFILE:No results for input(s): \"AST\", \"ALT\", \"BILIDIR\", \"BILITOT\", \"ALKPHOS\" in the last 72 hours. CHOLESTEROL AND A1C:No results for input(s): \"LDLCALC\", \"HDL\", \"CHOL\", \"TRIG\", \"LABA1C\" in the last 720 hours. Imaging Last 24 Hours:  MRI BRAIN W WO CONTRAST    Result Date: 12/11/2023  PROCEDURE: MRI BRAIN W WO CONTRAST CLINICAL INFORMATIONNew headache, constant, New hyponatremia. COMPARISON: CT scan of the brain dated 12/10/2023. Delorise Pillar TECHNIQUE: Multiplanar and multiple spin echo T1 and T2-weighted images were obtained through the brain before and after the administration of intravenous contrast. FINDINGS: The diffusion-weighted images are normal. The brain volume is reduced. There are no intra-or

## 2023-12-12 NOTE — FLOWSHEET NOTE
12/11/23 0195   Treatment Team Notification   Reason for Communication Patient/Family request  (MRI result explanation and what causes patient's headache.)   Name of Team Member Notified [facility]   Treatment Team Role Resident   Method of Communication Secure Message   Response Other (Comment)  (Responded.  Message relayed to family)   Notification Time [phone]

## 2023-12-13 VITALS
SYSTOLIC BLOOD PRESSURE: 144 MMHG | RESPIRATION RATE: 20 BRPM | BODY MASS INDEX: 43.65 KG/M2 | HEART RATE: 76 BPM | HEIGHT: 65 IN | TEMPERATURE: 98.2 F | DIASTOLIC BLOOD PRESSURE: 72 MMHG | OXYGEN SATURATION: 94 % | WEIGHT: 262 LBS

## 2023-12-13 LAB
ANION GAP SERPL CALC-SCNC: 9 MEQ/L (ref 8–16)
BUN SERPL-MCNC: 13 MG/DL (ref 7–22)
CALCIUM SERPL-MCNC: 8.9 MG/DL (ref 8.5–10.5)
CHLORIDE SERPL-SCNC: 97 MEQ/L (ref 98–111)
CO2 SERPL-SCNC: 32 MEQ/L (ref 23–33)
CREAT SERPL-MCNC: 0.6 MG/DL (ref 0.4–1.2)
DEPRECATED RDW RBC AUTO: 43.9 FL (ref 35–45)
ERYTHROCYTE [DISTWIDTH] IN BLOOD BY AUTOMATED COUNT: 13.6 % (ref 11.5–14.5)
GFR SERPL CREATININE-BSD FRML MDRD: > 60 ML/MIN/1.73M2
GLUCOSE SERPL-MCNC: 108 MG/DL (ref 70–108)
HCT VFR BLD AUTO: 33.7 % (ref 37–47)
HGB BLD-MCNC: 11.1 GM/DL (ref 12–16)
MAGNESIUM SERPL-MCNC: 2 MG/DL (ref 1.6–2.4)
MCH RBC QN AUTO: 28.9 PG (ref 26–33)
MCHC RBC AUTO-ENTMCNC: 32.9 GM/DL (ref 32.2–35.5)
MCV RBC AUTO: 87.8 FL (ref 81–99)
PLATELET # BLD AUTO: 290 THOU/MM3 (ref 130–400)
PMV BLD AUTO: 9 FL (ref 9.4–12.4)
POTASSIUM SERPL-SCNC: 4.5 MEQ/L (ref 3.5–5.2)
RBC # BLD AUTO: 3.84 MILL/MM3 (ref 4.2–5.4)
SODIUM SERPL-SCNC: 138 MEQ/L (ref 135–145)
WBC # BLD AUTO: 7 THOU/MM3 (ref 4.8–10.8)

## 2023-12-13 PROCEDURE — 80048 BASIC METABOLIC PNL TOTAL CA: CPT

## 2023-12-13 PROCEDURE — 6370000000 HC RX 637 (ALT 250 FOR IP): Performed by: STUDENT IN AN ORGANIZED HEALTH CARE EDUCATION/TRAINING PROGRAM

## 2023-12-13 PROCEDURE — 2580000003 HC RX 258

## 2023-12-13 PROCEDURE — 85027 COMPLETE CBC AUTOMATED: CPT

## 2023-12-13 PROCEDURE — 36415 COLL VENOUS BLD VENIPUNCTURE: CPT

## 2023-12-13 PROCEDURE — 83735 ASSAY OF MAGNESIUM: CPT

## 2023-12-13 PROCEDURE — 6370000000 HC RX 637 (ALT 250 FOR IP)

## 2023-12-13 RX ORDER — CIPROFLOXACIN 500 MG/1
500 TABLET, FILM COATED ORAL EVERY 12 HOURS SCHEDULED
Qty: 10 TABLET | Refills: 0 | DISCHARGE
Start: 2023-12-13 | End: 2023-12-18

## 2023-12-13 RX ORDER — OXYBUTYNIN CHLORIDE 10 MG/1
10 TABLET, EXTENDED RELEASE ORAL DAILY
Qty: 30 TABLET | Refills: 3 | DISCHARGE
Start: 2023-12-14

## 2023-12-13 RX ORDER — CARVEDILOL 12.5 MG/1
12.5 TABLET ORAL 2 TIMES DAILY WITH MEALS
Qty: 60 TABLET | Refills: 3 | DISCHARGE
Start: 2023-12-13

## 2023-12-13 RX ORDER — HYDRALAZINE HYDROCHLORIDE 50 MG/1
50 TABLET, FILM COATED ORAL 3 TIMES DAILY
Qty: 90 TABLET | Refills: 3 | DISCHARGE
Start: 2023-12-13

## 2023-12-13 RX ADMIN — HYDRALAZINE HYDROCHLORIDE 50 MG: 50 TABLET, FILM COATED ORAL at 14:31

## 2023-12-13 RX ADMIN — OXYBUTYNIN CHLORIDE 10 MG: 10 TABLET, EXTENDED RELEASE ORAL at 09:12

## 2023-12-13 RX ADMIN — CIPROFLOXACIN 500 MG: 500 TABLET, FILM COATED ORAL at 09:12

## 2023-12-13 RX ADMIN — SERTRALINE 25 MG: 25 TABLET, FILM COATED ORAL at 09:13

## 2023-12-13 RX ADMIN — GUAIFENESIN AND DEXTROMETHORPHAN 5 ML: 100; 10 SYRUP ORAL at 09:09

## 2023-12-13 RX ADMIN — ATORVASTATIN CALCIUM 80 MG: 80 TABLET, FILM COATED ORAL at 09:12

## 2023-12-13 RX ADMIN — HYDRALAZINE HYDROCHLORIDE 50 MG: 50 TABLET, FILM COATED ORAL at 09:12

## 2023-12-13 RX ADMIN — AMANTADINE 100 MG: 100 CAPSULE ORAL at 09:12

## 2023-12-13 RX ADMIN — POLYETHYLENE GLYCOL 3350 17 G: 17 POWDER, FOR SOLUTION ORAL at 09:08

## 2023-12-13 RX ADMIN — SODIUM CHLORIDE, PRESERVATIVE FREE 10 ML: 5 INJECTION INTRAVENOUS at 09:13

## 2023-12-13 RX ADMIN — CARVEDILOL 12.5 MG: 6.25 TABLET, FILM COATED ORAL at 09:13

## 2023-12-13 NOTE — DISCHARGE INSTRUCTIONS
Follow up with urology as soon as possible. Follow up with PCP for further medication management. Take ciprofloxacin for 5 more days.

## 2023-12-13 NOTE — CARE COORDINATION
12/13/23, 1:10 PM EST    Patient goals/plan/ treatment preferences discussed by  and . Patient goals/plan/ treatment preferences reviewed with patient/ family. Patient/ family verbalize understanding of discharge plan and are in agreement with goal/plan/treatment preferences. Understanding was demonstrated using the teach back method. AVS provided by RN at time of discharge, which includes all necessary medical information pertaining to the patients current course of illness, treatment, post-discharge goals of care, and treatment preferences. Services At/After Discharge: [facility] ([facility]) and In ambulance [facility], [facility]       IMM Letter  IMM Letter given to Patient/Family/Significant other/Guardian/POA/by[de-identified] Bakari Sutton RN CM  IMM Letter date given[de-identified] 12/13/23  IMM Letter time given[de-identified] 3704     Spoke with provider, planning discharge today. Call to [address] [place] [address], transport set for 3:30pm. IVAN faxed transport paperwork. IVAN updated provider, nurse, pt, and pt's daughter Uzair Serrano on transport time. Call to [place] with [facility], updated on plans for discharge today and transport time. IVAN faxed AVS to [facility].

## 2024-01-06 PROBLEM — N39.0 URINARY TRACT INFECTION: Status: RESOLVED | Noted: 2023-12-07 | Resolved: 2024-01-06

## 2024-01-29 ENCOUNTER — APPOINTMENT (OUTPATIENT)
Dept: CT IMAGING | Age: 64
End: 2024-01-29
Payer: MEDICARE

## 2024-01-29 ENCOUNTER — APPOINTMENT (OUTPATIENT)
Dept: GENERAL RADIOLOGY | Age: 64
End: 2024-01-29
Payer: MEDICARE

## 2024-01-29 ENCOUNTER — HOSPITAL ENCOUNTER (EMERGENCY)
Age: 64
Discharge: HOME | End: 2024-01-29
Attending: EMERGENCY MEDICINE
Payer: MEDICARE

## 2024-01-29 VITALS
TEMPERATURE: 98 F | SYSTOLIC BLOOD PRESSURE: 166 MMHG | HEART RATE: 77 BPM | DIASTOLIC BLOOD PRESSURE: 73 MMHG | OXYGEN SATURATION: 94 % | RESPIRATION RATE: 18 BRPM | BODY MASS INDEX: 43.27 KG/M2 | WEIGHT: 260 LBS

## 2024-01-29 DIAGNOSIS — N30.00 ACUTE CYSTITIS WITHOUT HEMATURIA: Primary | ICD-10-CM

## 2024-01-29 LAB
ALBUMIN SERPL BCG-MCNC: 3.8 G/DL (ref 3.5–5.1)
ALP SERPL-CCNC: 177 U/L (ref 38–126)
ALT SERPL W/O P-5'-P-CCNC: 43 U/L (ref 11–66)
ANION GAP SERPL CALC-SCNC: 8 MEQ/L (ref 8–16)
AST SERPL-CCNC: 67 U/L (ref 5–40)
BACTERIA URNS QL MICRO: ABNORMAL /HPF
BASOPHILS ABSOLUTE: 0 THOU/MM3 (ref 0–0.1)
BASOPHILS NFR BLD AUTO: 0.4 %
BILIRUB CONJ SERPL-MCNC: < 0.2 MG/DL (ref 0–0.3)
BILIRUB SERPL-MCNC: 0.6 MG/DL (ref 0.3–1.2)
BILIRUB UR QL STRIP.AUTO: NEGATIVE
BUN SERPL-MCNC: 17 MG/DL (ref 7–22)
CALCIUM SERPL-MCNC: 9.2 MG/DL (ref 8.5–10.5)
CASTS #/AREA URNS LPF: ABNORMAL /LPF
CASTS 2: ABNORMAL /LPF
CHARACTER UR: CLEAR
CHLORIDE SERPL-SCNC: 92 MEQ/L (ref 98–111)
CO2 SERPL-SCNC: 32 MEQ/L (ref 23–33)
COLOR: YELLOW
CREAT SERPL-MCNC: 0.7 MG/DL (ref 0.4–1.2)
CRYSTALS URNS MICRO: ABNORMAL
DEPRECATED RDW RBC AUTO: 46.5 FL (ref 35–45)
EKG ATRIAL RATE: 70 BPM
EKG P AXIS: 58 DEGREES
EKG P-R INTERVAL: 152 MS
EKG Q-T INTERVAL: 414 MS
EKG QRS DURATION: 94 MS
EKG QTC CALCULATION (BAZETT): 447 MS
EKG R AXIS: 7 DEGREES
EKG T AXIS: 42 DEGREES
EKG VENTRICULAR RATE: 70 BPM
EOSINOPHIL NFR BLD AUTO: 2.3 %
EOSINOPHILS ABSOLUTE: 0.2 THOU/MM3 (ref 0–0.4)
EPITHELIAL CELLS, UA: ABNORMAL /HPF
ERYTHROCYTE [DISTWIDTH] IN BLOOD BY AUTOMATED COUNT: 14.6 % (ref 11.5–14.5)
GFR SERPL CREATININE-BSD FRML MDRD: > 60 ML/MIN/1.73M2
GLUCOSE SERPL-MCNC: 104 MG/DL (ref 70–108)
GLUCOSE UR QL STRIP.AUTO: NEGATIVE MG/DL
HCT VFR BLD AUTO: 38.1 % (ref 37–47)
HGB BLD-MCNC: 12.3 GM/DL (ref 12–16)
HGB UR QL STRIP.AUTO: NEGATIVE
IMM GRANULOCYTES # BLD AUTO: 0.03 THOU/MM3 (ref 0–0.07)
IMM GRANULOCYTES NFR BLD AUTO: 0.3 %
KETONES UR QL STRIP.AUTO: NEGATIVE
LIPASE SERPL-CCNC: 26.7 U/L (ref 5.6–51.3)
LYMPHOCYTES ABSOLUTE: 1.2 THOU/MM3 (ref 1–4.8)
LYMPHOCYTES NFR BLD AUTO: 11.8 %
MCH RBC QN AUTO: 28 PG (ref 26–33)
MCHC RBC AUTO-ENTMCNC: 32.3 GM/DL (ref 32.2–35.5)
MCV RBC AUTO: 86.8 FL (ref 81–99)
MISCELLANEOUS 2: ABNORMAL
MONOCYTES ABSOLUTE: 0.8 THOU/MM3 (ref 0.4–1.3)
MONOCYTES NFR BLD AUTO: 7.7 %
NEUTROPHILS NFR BLD AUTO: 77.5 %
NITRITE UR QL STRIP: NEGATIVE
NRBC BLD AUTO-RTO: 0 /100 WBC
NT-PROBNP SERPL IA-MCNC: 200.2 PG/ML (ref 0–124)
OSMOLALITY SERPL CALC.SUM OF ELEC: 266.4 MOSMOL/KG (ref 275–300)
PH UR STRIP.AUTO: 8 [PH] (ref 5–9)
PLATELET # BLD AUTO: 219 THOU/MM3 (ref 130–400)
PMV BLD AUTO: 9.6 FL (ref 9.4–12.4)
POTASSIUM SERPL-SCNC: 3.6 MEQ/L (ref 3.5–5.2)
PROT SERPL-MCNC: 6.5 G/DL (ref 6.1–8)
PROT UR STRIP.AUTO-MCNC: NEGATIVE MG/DL
RBC # BLD AUTO: 4.39 MILL/MM3 (ref 4.2–5.4)
RBC URINE: ABNORMAL /HPF
RENAL EPI CELLS #/AREA URNS HPF: ABNORMAL /[HPF]
SEGMENTED NEUTROPHILS ABSOLUTE COUNT: 7.7 THOU/MM3 (ref 1.8–7.7)
SODIUM SERPL-SCNC: 132 MEQ/L (ref 135–145)
SP GR UR REFRACT.AUTO: 1.01 (ref 1–1.03)
TROPONIN, HIGH SENSITIVITY: 12 NG/L (ref 0–12)
TROPONIN, HIGH SENSITIVITY: 15 NG/L (ref 0–12)
UROBILINOGEN, URINE: 0.2 EU/DL (ref 0–1)
WBC # BLD AUTO: 9.9 THOU/MM3 (ref 4.8–10.8)
WBC #/AREA URNS HPF: ABNORMAL /HPF
WBC #/AREA URNS HPF: ABNORMAL /[HPF]
YEAST LIKE FUNGI URNS QL MICRO: ABNORMAL

## 2024-01-29 PROCEDURE — 83690 ASSAY OF LIPASE: CPT

## 2024-01-29 PROCEDURE — 99285 EMERGENCY DEPT VISIT HI MDM: CPT

## 2024-01-29 PROCEDURE — 85025 COMPLETE CBC W/AUTO DIFF WBC: CPT

## 2024-01-29 PROCEDURE — 6360000004 HC RX CONTRAST MEDICATION: Performed by: EMERGENCY MEDICINE

## 2024-01-29 PROCEDURE — 6360000002 HC RX W HCPCS

## 2024-01-29 PROCEDURE — 87086 URINE CULTURE/COLONY COUNT: CPT

## 2024-01-29 PROCEDURE — 84484 ASSAY OF TROPONIN QUANT: CPT

## 2024-01-29 PROCEDURE — 80076 HEPATIC FUNCTION PANEL: CPT

## 2024-01-29 PROCEDURE — 71046 X-RAY EXAM CHEST 2 VIEWS: CPT

## 2024-01-29 PROCEDURE — 96375 TX/PRO/DX INJ NEW DRUG ADDON: CPT

## 2024-01-29 PROCEDURE — 83880 ASSAY OF NATRIURETIC PEPTIDE: CPT

## 2024-01-29 PROCEDURE — 87186 SC STD MICRODIL/AGAR DIL: CPT

## 2024-01-29 PROCEDURE — 74177 CT ABD & PELVIS W/CONTRAST: CPT

## 2024-01-29 PROCEDURE — 81001 URINALYSIS AUTO W/SCOPE: CPT

## 2024-01-29 PROCEDURE — 87077 CULTURE AEROBIC IDENTIFY: CPT

## 2024-01-29 PROCEDURE — 6370000000 HC RX 637 (ALT 250 FOR IP)

## 2024-01-29 PROCEDURE — 96374 THER/PROPH/DIAG INJ IV PUSH: CPT

## 2024-01-29 PROCEDURE — 36415 COLL VENOUS BLD VENIPUNCTURE: CPT

## 2024-01-29 PROCEDURE — 80048 BASIC METABOLIC PNL TOTAL CA: CPT

## 2024-01-29 PROCEDURE — 93005 ELECTROCARDIOGRAM TRACING: CPT

## 2024-01-29 PROCEDURE — 93010 ELECTROCARDIOGRAM REPORT: CPT | Performed by: INTERNAL MEDICINE

## 2024-01-29 RX ORDER — DIPHENHYDRAMINE HYDROCHLORIDE 50 MG/ML
50 INJECTION INTRAMUSCULAR; INTRAVENOUS ONCE
Status: COMPLETED | OUTPATIENT
Start: 2024-01-29 | End: 2024-01-29

## 2024-01-29 RX ORDER — ACETAMINOPHEN 325 MG/1
650 TABLET ORAL ONCE
Status: COMPLETED | OUTPATIENT
Start: 2024-01-29 | End: 2024-01-29

## 2024-01-29 RX ORDER — CIPROFLOXACIN 250 MG/1
250 TABLET, FILM COATED ORAL 2 TIMES DAILY
Qty: 14 TABLET | Refills: 0 | Status: SHIPPED | OUTPATIENT
Start: 2024-01-29 | End: 2024-02-05

## 2024-01-29 RX ADMIN — ACETAMINOPHEN 650 MG: 325 TABLET ORAL at 16:29

## 2024-01-29 RX ADMIN — METHYLPREDNISOLONE SODIUM SUCCINATE 125 MG: 125 INJECTION, POWDER, FOR SOLUTION INTRAMUSCULAR; INTRAVENOUS at 16:29

## 2024-01-29 RX ADMIN — IOPAMIDOL 80 ML: 755 INJECTION, SOLUTION INTRAVENOUS at 16:40

## 2024-01-29 RX ADMIN — DIPHENHYDRAMINE HYDROCHLORIDE 50 MG: 50 INJECTION INTRAMUSCULAR; INTRAVENOUS at 16:29

## 2024-01-29 ASSESSMENT — PAIN DESCRIPTION - LOCATION
LOCATION: ABDOMEN
LOCATION: ABDOMEN;CHEST

## 2024-01-29 ASSESSMENT — PAIN DESCRIPTION - PAIN TYPE: TYPE: ACUTE PAIN

## 2024-01-29 ASSESSMENT — PAIN DESCRIPTION - ORIENTATION
ORIENTATION: ANTERIOR
ORIENTATION: MID;UPPER

## 2024-01-29 ASSESSMENT — PAIN DESCRIPTION - DESCRIPTORS: DESCRIPTORS: STABBING

## 2024-01-29 ASSESSMENT — PAIN SCALES - GENERAL
PAINLEVEL_OUTOF10: 2
PAINLEVEL_OUTOF10: 6

## 2024-01-29 ASSESSMENT — PAIN - FUNCTIONAL ASSESSMENT: PAIN_FUNCTIONAL_ASSESSMENT: 0-10

## 2024-01-29 NOTE — ED NOTES
Pt presents to the ED via [facility] from the [facility] for concerns of epigastric abdominal pain and chest pain that began about 1145, prior to lunch, and describes as a stabbing pain at a 6 /10. EKG completed.

## 2024-01-29 NOTE — ED PROVIDER NOTES
[facility] EMERGENCY DEPARTMENT  EMERGENCY DEPARTMENT ENCOUNTER          Pt Name: Vane Hickey  MRN: [id number]  Birthdate 1960  Date of evaluation: 2024  Resident Physician: Erwin Molina MD EM Resident PGY-2  Attending Physician: Imer Cuevas DO      CHIEF COMPLAINT       Chief Complaint   Patient presents with    Abdominal Pain    Chest Pain         HISTORY OF PRESENT ILLNESS    HPI  Vane Hickey is a 63 y.o. female who presents to the emergency department from nursing home, brought in by EMS for evaluation of abdominal pain, chest pain.  Patient states that she began to have sharp intermittent epigastric abdominal pain lasting a few minutes at a time starting at 1145 today.  Patient states that she has never had pain like this before.  Denies having any chest pain or shortness of breath.  Denies having any nausea, vomiting, diarrhea, constipation.  States that last bowel movement was yesterday.  Denies any dysuria or hematuria denies any fever or chills.  Patient denies any alcohol use or illicit drug use.  Patient states that she has had previous cholecystectomy,  section x 3 no other abdominal surgeries.  Patient is residing at nursing home status post stroke for which she has right-sided weakness which is unchanged.      The patient has no other acute complaints at this time.    ROS negative except as stated above.    PAST MEDICAL AND SURGICAL HISTORY     Past Medical History:   Diagnosis Date    Arrhythmia     Chronic pain     Edema     GERD (gastroesophageal reflux disease)     Hypertension     Stroke (cerebrum) (H[facility]) 2019    Urticaria      Past Surgical History:   Procedure Laterality Date    CARPAL TUNNEL RELEASE Bilateral      SECTION      x 3    CHOLECYSTECTOMY      COLONOSCOPY      COLONOSCOPY  2018    [place]    UPPER GASTROINTESTINAL ENDOSCOPY           MEDICATIONS   No current facility-administered medications for this encounter.    Current

## 2024-01-29 NOTE — ED NOTES
ED provider spoke on the telephone with patient's daughter to give an update on results and POC at this time.

## 2024-01-30 NOTE — DISCHARGE INSTRUCTIONS
You were seen evaluated emergency department today for epigastric abdominal pain.  Your laboratory workup and CT scan were reassuring.  Your urine showed signs of infection with your last urine culture growing Pseudomonas you have been discharged on ciprofloxacin please take as prescribed twice daily over the next 7 days.  Follow-up primary care physician within the next 2 days.  Return to emergency department anytime for acute or worrisome changes including fever 100.4 or higher, severe pain, inability to urinate or any other worrisome changes.

## 2024-01-30 NOTE — ED NOTES
Pt resting in bed and asks for water and food. Pt is breathing regular and unlabored. Pt has no signs of distress at this time. Call light within reach.

## 2024-01-31 LAB
BACTERIA UR CULT: ABNORMAL
ORGANISM: ABNORMAL

## 2024-02-01 ENCOUNTER — TELEPHONE (OUTPATIENT)
Dept: PHARMACY | Age: 64
End: 2024-02-01

## 2024-02-01 NOTE — TELEPHONE ENCOUNTER
Pharmacy Note  ED Culture Follow-up    Vane Hickey is a 63 y.o. female.     Allergies: Latex, Amoxicillin, Ciprofloxacin, Codeine, Demerol hcl [meperidine], Docusate, Metoprolol succinate [metoprolol], Norvasc [amlodipine], Omeprazole, Prednisone, Propofol, Senna, Sulfa antibiotics, Tomato, Ultram [tramadol hcl], Iv [iodides], Lisinopril, Losartan, and Z-waleska [azithromycin]     Labs:  Lab Results   Component Value Date    BUN 17 01/29/2024    CREATININE 0.7 01/29/2024    WBC 9.9 01/29/2024     Estimated Creatinine Clearance: 111 mL/min (based on SCr of 0.7 mg/dL).    Current antimicrobials:   Ciprofloxacin    ASSESSMENT:  Micro results:   Urine culture: positive for Proteus morabilis     PLAN:  Need for intervention: Yes, but will defer to provider at nursing home  Discussed with: Kelley Newsome MD  Chosen treatment:    Patient will be treated by provider at nursing home    Patient response:   Called and spoke with GLADIS Dias at the nursing home.  Larissa states they are already aware of the results at this time.     Called/sent in prescription to: Not applicable    Please call with any questions. Ext. [phone]    Larissa Bryant RPH, PharmD 4:48 PM 2/1/2024

## 2024-02-12 ENCOUNTER — OFFICE VISIT (OUTPATIENT)
Dept: PULMONOLOGY | Age: 64
End: 2024-02-12
Payer: MEDICARE

## 2024-02-12 VITALS
SYSTOLIC BLOOD PRESSURE: 110 MMHG | HEIGHT: 65 IN | HEART RATE: 68 BPM | DIASTOLIC BLOOD PRESSURE: 62 MMHG | TEMPERATURE: 99.1 F | OXYGEN SATURATION: 93 % | WEIGHT: 260 LBS | BODY MASS INDEX: 43.32 KG/M2

## 2024-02-12 DIAGNOSIS — G47.33 OSA (OBSTRUCTIVE SLEEP APNEA): Primary | ICD-10-CM

## 2024-02-12 DIAGNOSIS — R06.83 SNORING: ICD-10-CM

## 2024-02-12 DIAGNOSIS — G47.19 EXCESSIVE DAYTIME SLEEPINESS: ICD-10-CM

## 2024-02-12 PROCEDURE — G8427 DOCREV CUR MEDS BY ELIG CLIN: HCPCS | Performed by: INTERNAL MEDICINE

## 2024-02-12 PROCEDURE — 3078F DIAST BP <80 MM HG: CPT | Performed by: INTERNAL MEDICINE

## 2024-02-12 PROCEDURE — 3017F COLORECTAL CA SCREEN DOC REV: CPT | Performed by: INTERNAL MEDICINE

## 2024-02-12 PROCEDURE — 3074F SYST BP LT 130 MM HG: CPT | Performed by: INTERNAL MEDICINE

## 2024-02-12 PROCEDURE — 1036F TOBACCO NON-USER: CPT | Performed by: INTERNAL MEDICINE

## 2024-02-12 PROCEDURE — G8417 CALC BMI ABV UP PARAM F/U: HCPCS | Performed by: INTERNAL MEDICINE

## 2024-02-12 PROCEDURE — G8484 FLU IMMUNIZE NO ADMIN: HCPCS | Performed by: INTERNAL MEDICINE

## 2024-02-12 PROCEDURE — 99204 OFFICE O/P NEW MOD 45 MIN: CPT | Performed by: INTERNAL MEDICINE

## 2024-02-12 RX ORDER — NITROFURANTOIN 25; 75 MG/1; MG/1
CAPSULE ORAL
COMMUNITY
Start: 2023-11-30

## 2024-02-12 RX ORDER — DEXTROMETHORPHAN POLISTIREX 30 MG/5ML
60 SUSPENSION ORAL 2 TIMES DAILY PRN
COMMUNITY

## 2024-02-12 RX ORDER — POLYETHYLENE GLYCOL 3350 17 G/17G
17 POWDER, FOR SOLUTION ORAL DAILY
COMMUNITY

## 2024-02-12 NOTE — PROGRESS NOTES
New Sleep Patient H/P    Presentation:  Vane is referred by camilla bird for  possible sleep apnea     Ms. Hickey is a 63-year-old female who presents to sleep clinic today for reevaluation given her history of sleep apnea.  She was pretty treated for sleep apnea 10 years ago however PAP therapy was discontinued due to her feeling claustrophobic with use.  Since then she unfortunately suffered a stroke and was recently admitted in 2023 for sepsis due to UTI.  She currently wears nocturnal supplemental oxygen 2 L during sleep.  Patient reports that her  uses that she snored but no witnessed apneic events at her current nursing home facility.  She denies waking up with headaches.  She reports feeling rested upon awakening in the morning.  She does not drive.  Will fall asleep watching movies at night.    Her sleep schedule is as follows: She goes to sleep at 4075-2032 and will usually fall asleep right away but about 1 night a month she will be unable to fall asleep until 2 AM.  She wakes up 3-4 times a night to use the bathroom and is usually able to go right back to sleep.  She wakes up at 6890-2843 when her medications are due.  She does not watch TV or use her phone in bed.  She does not have dreams.  She reports weight gain of 10 to 15 pounds over the last year since her stroke.  She will drink green tea in the morning but no caffeinated beverages in the afternoon.  She will drink water and juice up until 1800. No naps during the day.       Past Medical History:   Diagnosis Date    Arrhythmia     Chronic pain     Edema     GERD (gastroesophageal reflux disease)     Hypertension     Stroke (cerebrum) (HCC) 2019    Urticaria        Past Surgical History:   Procedure Laterality Date    CARPAL TUNNEL RELEASE Bilateral      SECTION      x 3    CHOLECYSTECTOMY      COLONOSCOPY      COLONOSCOPY  2018    [place]    UPPER GASTROINTESTINAL ENDOSCOPY

## 2024-02-13 VITALS
SYSTOLIC BLOOD PRESSURE: 133 MMHG | RESPIRATION RATE: 16 BRPM | TEMPERATURE: 98.2 F | BODY MASS INDEX: 47.94 KG/M2 | HEART RATE: 75 BPM | OXYGEN SATURATION: 97 % | DIASTOLIC BLOOD PRESSURE: 74 MMHG | WEIGHT: 288.1 LBS

## 2024-02-13 NOTE — PROGRESS NOTES
Vane Hickey is a 63 y.o. female who presents today for her medical conditions/complaints as noted below.   Chief Complaint   Patient presents with    1 Month Follow-Up           HPI:     Luisa is seen for her monthly chronic illness f/u.  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 She is seen while sitting in her wheelchair in her room watching TV.  She denies pain or shortness of breath.  She remains on room air with no distress.  Weight stable since 23.   Will have weight rechecked tomorrow.  Daughter having facility make multiple f/u visits with specialists.  She is alert and oriented.     Sent to ER for sharp abdominal pain 24 with very high LFTs.  She thinks that resolved after she stopped drinking too much OJ.  However, she was also treated for a UTI with cipro.  Only concern today is runny nose.  Orders given to keep saline nasal spray at bedside.        Past Medical History:   Diagnosis Date    Arrhythmia     Chronic pain     Edema     GERD (gastroesophageal reflux disease)     Hypertension     Stroke (cerebrum) (HCC) 2019    Urticaria       Past Surgical History:   Procedure Laterality Date    CARPAL TUNNEL RELEASE Bilateral      SECTION      x 3    CHOLECYSTECTOMY      COLONOSCOPY      COLONOSCOPY  2018    [place]    UPPER GASTROINTESTINAL ENDOSCOPY         Family History   Problem Relation Age of Onset    High Blood Pressure Mother     Arthritis Sister     Cancer Brother         colon cancer    Other Daughter         bipolar disorder       Social History     Tobacco Use    Smoking status: Former     Current packs/day: 0.00     Average packs/day: 1 pack/day for 10.0 years (10.0 ttl pk-yrs)     Types: Cigarettes     Start date:      Quit date:      Years since quittin.1    Smokeless tobacco: Never   Substance Use Topics

## 2024-02-14 ENCOUNTER — TELEPHONE (OUTPATIENT)
Dept: SLEEP CENTER | Age: 64
End: 2024-02-14

## 2024-02-14 DIAGNOSIS — G47.19 EXCESSIVE DAYTIME SLEEPINESS: ICD-10-CM

## 2024-02-14 DIAGNOSIS — G47.33 OSA (OBSTRUCTIVE SLEEP APNEA): Primary | ICD-10-CM

## 2024-02-14 DIAGNOSIS — R06.83 SNORING: ICD-10-CM

## 2024-02-14 NOTE — TELEPHONE ENCOUNTER
Hi Dr. Tran,  Regarding the in-lab Split night study you ordered; I spoke to the nursing staff at [facility] regarding the mobility of Vane, she requires a lift to move her from the wheelchair to the bed. Due to a risk of falling and safety of the patient/staff, we cannot accept her for an in-lab sleep study. We would like to attempt a Home Sleep Study instead. If agree, can you please place an order for us.     Thanks so much.

## 2024-02-15 ENCOUNTER — OUTSIDE SERVICES (OUTPATIENT)
Dept: FAMILY MEDICINE CLINIC | Age: 64
End: 2024-02-15

## 2024-02-15 DIAGNOSIS — I63.332 CEREBROVASCULAR ACCIDENT (CVA) DUE TO THROMBOSIS OF LEFT POSTERIOR CEREBRAL ARTERY (HCC): ICD-10-CM

## 2024-02-15 DIAGNOSIS — F41.9 ANXIETY AND DEPRESSION: ICD-10-CM

## 2024-02-15 DIAGNOSIS — R20.2 NUMBNESS AND TINGLING OF RIGHT UPPER AND LOWER EXTREMITY: ICD-10-CM

## 2024-02-15 DIAGNOSIS — N36.42 URINARY INCONTINENCE DUE TO URETHRAL SPHINCTER INCOMPETENCE: ICD-10-CM

## 2024-02-15 DIAGNOSIS — Z87.891 EX-SMOKER: ICD-10-CM

## 2024-02-15 DIAGNOSIS — I69.30 HISTORY OF CEREBROVASCULAR ACCIDENT (CVA) WITH RESIDUAL DEFICIT: ICD-10-CM

## 2024-02-15 DIAGNOSIS — R32 URINARY INCONTINENCE DUE TO URETHRAL SPHINCTER INCOMPETENCE: ICD-10-CM

## 2024-02-15 DIAGNOSIS — R53.83 LETHARGY: ICD-10-CM

## 2024-02-15 DIAGNOSIS — I63.9 LEFT PONTINE STROKE (HCC): ICD-10-CM

## 2024-02-15 DIAGNOSIS — G81.91 RIGHT HEMIPLEGIA (HCC): ICD-10-CM

## 2024-02-15 DIAGNOSIS — R20.0 NUMBNESS AND TINGLING OF RIGHT UPPER AND LOWER EXTREMITY: ICD-10-CM

## 2024-02-15 DIAGNOSIS — I63.81 LEFT SIDED LACUNAR INFARCTION (HCC): ICD-10-CM

## 2024-02-15 DIAGNOSIS — G47.9 SLEEP DIFFICULTIES: ICD-10-CM

## 2024-02-15 DIAGNOSIS — F32.A ANXIETY AND DEPRESSION: ICD-10-CM

## 2024-02-15 DIAGNOSIS — G47.33 OSA (OBSTRUCTIVE SLEEP APNEA): ICD-10-CM

## 2024-02-15 DIAGNOSIS — R00.2 INTERMITTENT PALPITATIONS: ICD-10-CM

## 2024-02-15 DIAGNOSIS — E78.2 MIXED HYPERLIPIDEMIA: ICD-10-CM

## 2024-02-15 DIAGNOSIS — J30.2 SEASONAL ALLERGIES: ICD-10-CM

## 2024-02-15 DIAGNOSIS — I10 PRIMARY HYPERTENSION: Primary | ICD-10-CM

## 2024-02-15 DIAGNOSIS — M15.8 OTHER OSTEOARTHRITIS INVOLVING MULTIPLE JOINTS: ICD-10-CM

## 2024-02-15 DIAGNOSIS — F51.01 PRIMARY INSOMNIA: ICD-10-CM

## 2024-02-15 DIAGNOSIS — E66.01 SEVERE OBESITY (BMI >= 40) (HCC): ICD-10-CM

## 2024-02-19 ENCOUNTER — OFFICE VISIT (OUTPATIENT)
Dept: CARDIOLOGY | Age: 64
End: 2024-02-19
Payer: MEDICARE

## 2024-02-19 ENCOUNTER — TELEPHONE (OUTPATIENT)
Dept: CARDIOLOGY | Age: 64
End: 2024-02-19

## 2024-02-19 VITALS — SYSTOLIC BLOOD PRESSURE: 128 MMHG | OXYGEN SATURATION: 95 % | DIASTOLIC BLOOD PRESSURE: 78 MMHG | HEART RATE: 74 BPM

## 2024-02-19 DIAGNOSIS — I10 PRIMARY HYPERTENSION: Primary | ICD-10-CM

## 2024-02-19 PROCEDURE — G8427 DOCREV CUR MEDS BY ELIG CLIN: HCPCS | Performed by: SURGERY

## 2024-02-19 PROCEDURE — 99212 OFFICE O/P EST SF 10 MIN: CPT | Performed by: SURGERY

## 2024-02-19 PROCEDURE — 3017F COLORECTAL CA SCREEN DOC REV: CPT | Performed by: SURGERY

## 2024-02-19 PROCEDURE — 3074F SYST BP LT 130 MM HG: CPT | Performed by: SURGERY

## 2024-02-19 PROCEDURE — 3078F DIAST BP <80 MM HG: CPT | Performed by: SURGERY

## 2024-02-19 PROCEDURE — 1036F TOBACCO NON-USER: CPT | Performed by: SURGERY

## 2024-02-19 PROCEDURE — G8484 FLU IMMUNIZE NO ADMIN: HCPCS | Performed by: SURGERY

## 2024-02-19 PROCEDURE — 99214 OFFICE O/P EST MOD 30 MIN: CPT | Performed by: SURGERY

## 2024-02-19 PROCEDURE — G8417 CALC BMI ABV UP PARAM F/U: HCPCS | Performed by: SURGERY

## 2024-02-19 RX ORDER — CARVEDILOL 12.5 MG/1
25 TABLET ORAL 2 TIMES DAILY
Qty: 60 TABLET | Refills: 3 | Status: SHIPPED | OUTPATIENT
Start: 2024-02-19

## 2024-02-19 NOTE — PROGRESS NOTES
Cardiology Consultation/Follow Up.  [facility]    CC: Patient is here for a follow up visit.     HPI  Vane Hickey is here for HTN, hisotry of CVA with residual     denies  any chest pain or discomfort. No orthopnea or PND. Marleen any palpitation, dizziness or syncope.  Had brought blood pressure log from nursing home and systolic blood pressure has been elevated in upper 150s with 1 normal blood pressure    Past Medical:  Past Medical History:   Diagnosis Date    Arrhythmia     Chronic pain     Edema     GERD (gastroesophageal reflux disease)     Hypertension     Stroke (cerebrum) (HCC) 2019    Urticaria        Past Surgical:  Past Surgical History:   Procedure Laterality Date    CARPAL TUNNEL RELEASE Bilateral      SECTION      x 3    CHOLECYSTECTOMY      COLONOSCOPY      COLONOSCOPY  2018    [place]    UPPER GASTROINTESTINAL ENDOSCOPY         Family History:  Family History   Problem Relation Age of Onset    High Blood Pressure Mother     Arthritis Sister     Cancer Brother         colon cancer    Other Daughter         bipolar disorder       Social History:  Social History     Tobacco Use    Smoking status: Former     Current packs/day: 0.00     Average packs/day: 1 pack/day for 10.0 years (10.0 ttl pk-yrs)     Types: Cigarettes     Start date:      Quit date:      Years since quittin.1    Smokeless tobacco: Never   Vaping Use    Vaping Use: Never used   Substance Use Topics    Alcohol use: No     Alcohol/week: 0.0 standard drinks of alcohol    Drug use: No        REVIEW OF SYSTEMS:    Constitutional: there has been decline in functional capacity due to right hemiparesis  Eyes: No visual changes or diplopia. No scleral icterus.  ENT: No Headaches, hearing loss or vertigo. No mouth sores or sore throat.  Cardiovascular: As described in HPI.   Respiratory: AS HPI  Gastrointestinal: No abdominal pain, appetite loss, blood in stools. No change in bowel or bladder

## 2024-02-19 NOTE — TELEPHONE ENCOUNTER
[facility] called stating the carvedilol has 2 sets of directions. Please call to clarify.    [phone]    Last Appt:  2/19/2024  Next Appt:   7/15/2024  Med verified in Epic

## 2024-02-20 ENCOUNTER — TELEPHONE (OUTPATIENT)
Dept: SLEEP CENTER | Age: 64
End: 2024-02-20

## 2024-02-20 NOTE — TELEPHONE ENCOUNTER
Spoke to William Lamb regarding scheduling the Home sleep study instruction and setup for Vane Moran. It was explained that the NH does not have the staff to pickup the unit as was advised to call her daughter to see if she can help out. Vane's daughterSteph was called, she lives an hour away and cannot do at this time, she will be calling the NH to speak to someone regarding the situation. It was left that she will call the sleep lab back if the home sleep study can be scheduled.

## 2024-02-20 NOTE — TELEPHONE ENCOUNTER
Hi Dr. Tran,  We are trying to schedule Vane for an HST. She is currently on O2-2lpm at night time only therefore we would typically start her on room air since she is on Medicare. I explained this to her daughter, she has a concern that the facility will not monitor her closely and/or check her O2 sats therefore wanted me to check with you on if she should wear the oxygen for the sleep study. Please advise.  Thank you.

## 2024-03-07 ENCOUNTER — TELEPHONE (OUTPATIENT)
Dept: SLEEP CENTER | Age: 64
End: 2024-03-07

## 2024-03-07 NOTE — TELEPHONE ENCOUNTER
Received vm from Pattie Lamb [facility] wanting to confirm patients appt for 03/19/24 @ 8pm.  I called Ivana back and left her a vm explaining that pt is not scheduled on the date she mentioned, that she is in fact scheduled for an HST on 04/04 but we had been waiting to hear back from the NH as to who would be picking unit up.  I explained that it had been determined right away that pt could not do in lab sleep study due to her mobility.  We have spoken to several different people, on several different occasions about this.    Asked for a KIA.        Haley

## 2024-04-04 ENCOUNTER — HOSPITAL ENCOUNTER (OUTPATIENT)
Dept: SLEEP CENTER | Age: 64
Discharge: HOME | End: 2024-04-06
Payer: MEDICARE

## 2024-04-04 DIAGNOSIS — G47.19 EXCESSIVE DAYTIME SLEEPINESS: ICD-10-CM

## 2024-04-04 DIAGNOSIS — G47.33 OSA (OBSTRUCTIVE SLEEP APNEA): ICD-10-CM

## 2024-04-04 DIAGNOSIS — R06.83 SNORING: ICD-10-CM

## 2024-04-04 PROCEDURE — 95806 SLEEP STUDY UNATT&RESP EFFT: CPT

## 2024-04-04 NOTE — PROGRESS NOTES
Vane presents today for a HST instruction and demonstration on unit # [id number]. Questions were asked and answers given.  She was able to return demonstration and verbalized understanding.  The sleep center control room phone number was provided in case questions arise during the study. Informed patient to call 911 in case of an emergency.   She states she will return the unit tomorrow before 1000.                       Title:  Home Sleep Apnea Testing (HSAT)     Approved by:  Melva López MD        Approval Date:   March, 2024 Next Review:   March, 2026       Responsible Party:  Love Phillips  Institution/Entities Applies to:    [facility]    Policy Number:  None      Document Type:  Such as Guideline, Policy,   Policy & Procedure, or Procedure, Instructions   Manual:  Policy and Procedures     Section: IV  Policy Start Date: June, 2011       HOME SLEEP APNEA TESTING (HSAT)      PURPOSE: To ensure home sleep apnea testing (HSAT) conducted by the sleep facility adheres to the current AASM practice parameters, clinical practice guidelines, best practice and clinical guidelines in regard to the diagnosis of TROY in adults.       POLICY:      HSAT is a method of recording certain parameters which will target and measure, minimally, heart rate, oxygen saturation, respiratory airflow, respiratory effort and snoring for the purpose of evaluating a patient for TROY.       HSAT will be performed in conjunction with a comprehensive sleep evaluation by an appropriately licensed sleep facility medical staff member. All portable monitoring equipment will be FDA-approved and appropriately maintained to ensure patient safety and efficiency of the test.       PROCEDURE:      An order from a licensed sleep physician along with appropriate medical history documenting the indication for HSAT that complies with the AASM practice parameters.    All tests will be performed, and records will be

## 2024-04-07 ENCOUNTER — TELEPHONE (OUTPATIENT)
Dept: SLEEP CENTER | Age: 64
End: 2024-04-07

## 2024-04-07 DIAGNOSIS — G47.33 OSA (OBSTRUCTIVE SLEEP APNEA): Primary | ICD-10-CM

## 2024-04-07 NOTE — TELEPHONE ENCOUNTER
Vane completed an HST on 4/4/24.The Nursing Home staff was instructed on the equipment, they returned it the following day. Vane Moran is restricted with her mobility and needs a dexter life, therefore an HST was completed. She will need the F/U appt. Moved up to discuss the findings of the sleep study. Please contact the NH to R/S this appt.if need be. APAP is recommended.  Thanks.

## 2024-04-08 NOTE — TELEPHONE ENCOUNTER
Spoke with Siobhan at the [facility] and she requested that we fax the cpap order to her at [phone] and they would take care of the order, also we scheduled her for May 20th to come to office for an appt.    Order and sleep study was sent to the [facility]

## 2024-04-17 VITALS
OXYGEN SATURATION: 96 % | DIASTOLIC BLOOD PRESSURE: 51 MMHG | WEIGHT: 293 LBS | BODY MASS INDEX: 51.02 KG/M2 | RESPIRATION RATE: 16 BRPM | HEART RATE: 89 BPM | SYSTOLIC BLOOD PRESSURE: 154 MMHG | TEMPERATURE: 98.3 F

## 2024-04-17 ASSESSMENT — ENCOUNTER SYMPTOMS
RHINORRHEA: 0
EYE PAIN: 0
NAUSEA: 0
ALLERGIC/IMMUNOLOGIC NEGATIVE: 1
COUGH: 0
VOMITING: 0
SHORTNESS OF BREATH: 0
EYE DISCHARGE: 0
CHEST TIGHTNESS: 0
SORE THROAT: 0
ABDOMINAL PAIN: 0
ABDOMINAL DISTENTION: 0
TROUBLE SWALLOWING: 0

## 2024-04-17 NOTE — PROGRESS NOTES
Vane Hickey is a 63 y.o. female who presents today for her medical conditions/complaints as noted below.   Chief Complaint   Patient presents with    1 Month Follow-Up           HPI:     Luisa is seen for her monthly chronic illness f/u.  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 She is seen while sitting in her wheelchair in her room watching.  She denies pain or shortness of breath.  She remains on room air with no distress.  Weight increasing which worries her.  We reviewed healthy eating due to having baked goods in her room.  She denies eating sweets and carbs.  Interested in meeting with the dietician.  She is also asking about hair loss.  She did have covid earlier this year so it may be telogen effluvium but she would liek labs drawn. Spots of bruising on the abdomen and back and lower legs is better with steroid cream.  She is alert and oriented.         Past Medical History:   Diagnosis Date    Arrhythmia     Chronic pain     Edema     GERD (gastroesophageal reflux disease)     Hypertension     Stroke (cerebrum) ([facility]) 2019    Urticaria       Past Surgical History:   Procedure Laterality Date    CARPAL TUNNEL RELEASE Bilateral      SECTION      x 3    CHOLECYSTECTOMY      COLONOSCOPY      COLONOSCOPY  2018    [place]    UPPER GASTROINTESTINAL ENDOSCOPY         Family History   Problem Relation Age of Onset    High Blood Pressure Mother     Arthritis Sister     Cancer Brother         colon cancer    Other Daughter         bipolar disorder       Social History     Tobacco Use    Smoking status: Former     Current packs/day: 0.00     Average packs/day: 1 pack/day for 10.0 years (10.0 ttl pk-yrs)     Types: Cigarettes     Start date:      Quit date:      Years since quittin.3    Smokeless tobacco: Never   Substance Use Topics    Alcohol

## 2024-04-18 ENCOUNTER — OUTSIDE SERVICES (OUTPATIENT)
Dept: FAMILY MEDICINE CLINIC | Age: 64
End: 2024-04-18
Payer: MEDICARE

## 2024-04-18 DIAGNOSIS — G81.91 RIGHT HEMIPLEGIA (HCC): ICD-10-CM

## 2024-04-18 DIAGNOSIS — I10 PRIMARY HYPERTENSION: ICD-10-CM

## 2024-04-18 DIAGNOSIS — E66.01 SEVERE OBESITY (BMI >= 40) (HCC): ICD-10-CM

## 2024-04-18 DIAGNOSIS — M15.8 OTHER OSTEOARTHRITIS INVOLVING MULTIPLE JOINTS: ICD-10-CM

## 2024-04-18 DIAGNOSIS — J30.2 SEASONAL ALLERGIES: ICD-10-CM

## 2024-04-18 DIAGNOSIS — F32.A ANXIETY AND DEPRESSION: ICD-10-CM

## 2024-04-18 DIAGNOSIS — I63.9 LEFT PONTINE STROKE (HCC): ICD-10-CM

## 2024-04-18 DIAGNOSIS — I63.81 LEFT SIDED LACUNAR INFARCTION (HCC): ICD-10-CM

## 2024-04-18 DIAGNOSIS — R32 URINARY INCONTINENCE DUE TO URETHRAL SPHINCTER INCOMPETENCE: ICD-10-CM

## 2024-04-18 DIAGNOSIS — R20.2 NUMBNESS AND TINGLING OF RIGHT UPPER AND LOWER EXTREMITY: ICD-10-CM

## 2024-04-18 DIAGNOSIS — R00.2 INTERMITTENT PALPITATIONS: ICD-10-CM

## 2024-04-18 DIAGNOSIS — F41.9 ANXIETY AND DEPRESSION: ICD-10-CM

## 2024-04-18 DIAGNOSIS — N20.0 KIDNEY STONES: ICD-10-CM

## 2024-04-18 DIAGNOSIS — Z87.891 EX-SMOKER: ICD-10-CM

## 2024-04-18 DIAGNOSIS — I69.30 HISTORY OF CEREBROVASCULAR ACCIDENT (CVA) WITH RESIDUAL DEFICIT: Primary | ICD-10-CM

## 2024-04-18 DIAGNOSIS — R20.0 NUMBNESS AND TINGLING OF RIGHT UPPER AND LOWER EXTREMITY: ICD-10-CM

## 2024-04-18 DIAGNOSIS — G47.9 SLEEP DIFFICULTIES: ICD-10-CM

## 2024-04-18 DIAGNOSIS — R53.83 LETHARGY: ICD-10-CM

## 2024-04-18 DIAGNOSIS — E78.2 MIXED HYPERLIPIDEMIA: ICD-10-CM

## 2024-04-18 DIAGNOSIS — N36.42 URINARY INCONTINENCE DUE TO URETHRAL SPHINCTER INCOMPETENCE: ICD-10-CM

## 2024-04-18 DIAGNOSIS — G47.33 OSA (OBSTRUCTIVE SLEEP APNEA): ICD-10-CM

## 2024-04-18 PROCEDURE — 99490 CHRNC CARE MGMT STAFF 1ST 20: CPT | Performed by: FAMILY MEDICINE

## 2024-04-18 PROCEDURE — 99309 SBSQ NF CARE MODERATE MDM 30: CPT | Performed by: FAMILY MEDICINE

## 2024-05-03 ENCOUNTER — OUTSIDE SERVICES (OUTPATIENT)
Dept: INTERNAL MEDICINE | Age: 64
End: 2024-05-03

## 2024-05-03 DIAGNOSIS — R60.0 LOCAL EDEMA: ICD-10-CM

## 2024-05-03 DIAGNOSIS — G81.91 RIGHT HEMIPLEGIA (HCC): ICD-10-CM

## 2024-05-03 DIAGNOSIS — R73.01 ELEVATED FASTING GLUCOSE: ICD-10-CM

## 2024-05-03 DIAGNOSIS — E78.2 MIXED HYPERLIPIDEMIA: ICD-10-CM

## 2024-05-03 DIAGNOSIS — I10 PRIMARY HYPERTENSION: ICD-10-CM

## 2024-05-03 DIAGNOSIS — I63.332 CEREBROVASCULAR ACCIDENT (CVA) DUE TO THROMBOSIS OF LEFT POSTERIOR CEREBRAL ARTERY (HCC): Primary | ICD-10-CM

## 2024-05-03 DIAGNOSIS — G47.33 OSA (OBSTRUCTIVE SLEEP APNEA): ICD-10-CM

## 2024-05-03 DIAGNOSIS — E66.01 SEVERE OBESITY (BMI >= 40) (HCC): ICD-10-CM

## 2024-05-03 ASSESSMENT — ENCOUNTER SYMPTOMS
VOMITING: 0
EYE PAIN: 0
BLOOD IN STOOL: 0
CONSTIPATION: 0
FACIAL SWELLING: 0
COUGH: 0
NAUSEA: 0
SORE THROAT: 0
SINUS PRESSURE: 0
SHORTNESS OF BREATH: 0
RHINORRHEA: 0
COLOR CHANGE: 0
TROUBLE SWALLOWING: 0
DIARRHEA: 0
CHEST TIGHTNESS: 0
WHEEZING: 0
ABDOMINAL PAIN: 0

## 2024-05-03 NOTE — PROGRESS NOTES
05/03/24  Vane Hickey  1960    Patient Resident of [facility]    Chief Complaint  1. Cerebrovascular accident (CVA) due to thrombosis of left posterior cerebral artery (HCC)    2. Right hemiplegia (HCC)    3. Elevated fasting glucose    4. Mixed hyperlipidemia    5. TROY (obstructive sleep apnea)    6. Primary hypertension    7. Severe obesity (BMI >= 40) (HCC)    8. Local edema        HPI:  63-year-old patient recently transferred to [facility] from [facility] where it appears she was admitted there on 10/15/2023.   Past medical history obtained from chart and patient.  Patient with a history of a left pontine infarct with right hemiparesis in 2019.  States has been at nursing home since due to weakness of the right side.  Also recently underwent sleep study which showed obstructive sleep apnea per staff and needs set up with pulmonology.  Patient states she has gained 40 pounds in the last 3 months.  States they did recently draw labs on her at the [facility] and was told it would take 2 weeks to get the results.  We will attempt to obtain these results.  States she also has had significant hair loss.  Review of chart concern it could have been telogen effluvium  from COVID.  Patient states she does not remember being ill.  Patient states she also has brown spots.  Review of chart showed that these have been dissipating with steroid cream.  Patient does admit that some spots have gone but wanting to know why they are there.  Previous notes suggestive of eczema.    Patient states she is here for better therapy to get stronger so she can get back home independently.  States she currently does not have a home.  Does have children that live in town.  She denies any problems with bowel or bladder.  Denies any shortness of breath.  Is sitting upright watching TV.  Vitals have been stable.  Nursing staff deny any acute nursing service issues          Allergies   Allergen Reactions    Latex

## 2024-05-08 ENCOUNTER — HOSPITAL ENCOUNTER (OUTPATIENT)
Age: 64
Setting detail: SPECIMEN
Discharge: HOME | End: 2024-05-08
Payer: MEDICARE

## 2024-05-08 LAB
ANION GAP SERPL CALCULATED.3IONS-SCNC: 8 MMOL/L (ref 9–17)
BUN SERPL-MCNC: 17 MG/DL (ref 8–23)
BUN/CREAT SERPL: 24 (ref 9–20)
CALCIUM SERPL-MCNC: 9.3 MG/DL (ref 8.6–10.4)
CHLORIDE SERPL-SCNC: 99 MMOL/L (ref 98–107)
CO2 SERPL-SCNC: 32 MMOL/L (ref 20–31)
CREAT SERPL-MCNC: 0.7 MG/DL (ref 0.5–0.9)
ERYTHROCYTE [DISTWIDTH] IN BLOOD BY AUTOMATED COUNT: 14.2 % (ref 11.8–14.4)
GFR, ESTIMATED: >90 ML/MIN/1.73M2
GLUCOSE SERPL-MCNC: 104 MG/DL (ref 70–99)
HCT VFR BLD AUTO: 34.8 % (ref 36.3–47.1)
HGB BLD-MCNC: 11.6 G/DL (ref 11.9–15.1)
MCH RBC QN AUTO: 27.8 PG (ref 25.2–33.5)
MCHC RBC AUTO-ENTMCNC: 33.3 G/DL (ref 25.2–33.5)
MCV RBC AUTO: 83.3 FL (ref 82.6–102.9)
NRBC BLD-RTO: 0 PER 100 WBC
PLATELET # BLD AUTO: 216 K/UL (ref 138–453)
PMV BLD AUTO: 9.7 FL (ref 8.1–13.5)
POTASSIUM SERPL-SCNC: 3.8 MMOL/L (ref 3.7–5.3)
RBC # BLD AUTO: 4.18 M/UL (ref 3.95–5.11)
SODIUM SERPL-SCNC: 139 MMOL/L (ref 135–144)
WBC OTHER # BLD: 6 K/UL (ref 3.5–11.3)

## 2024-05-08 PROCEDURE — 36415 COLL VENOUS BLD VENIPUNCTURE: CPT

## 2024-05-08 PROCEDURE — 80048 BASIC METABOLIC PNL TOTAL CA: CPT

## 2024-05-08 PROCEDURE — 85027 COMPLETE CBC AUTOMATED: CPT

## 2024-05-22 DIAGNOSIS — M25.562 PAIN IN BOTH KNEES, UNSPECIFIED CHRONICITY: Primary | ICD-10-CM

## 2024-05-22 DIAGNOSIS — M25.561 PAIN IN BOTH KNEES, UNSPECIFIED CHRONICITY: Primary | ICD-10-CM

## 2024-05-27 PROCEDURE — 99305 1ST NF CARE MODERATE MDM 35: CPT | Performed by: INTERNAL MEDICINE

## 2024-06-03 ENCOUNTER — HOSPITAL ENCOUNTER (OUTPATIENT)
Age: 64
Setting detail: SPECIMEN
Discharge: HOME | End: 2024-06-03
Payer: MEDICARE

## 2024-06-03 LAB
BASOPHILS # BLD: 0.04 K/UL (ref 0–0.2)
BASOPHILS NFR BLD: 1 % (ref 0–2)
EOSINOPHIL # BLD: 0.19 K/UL (ref 0–0.44)
EOSINOPHILS RELATIVE PERCENT: 3 % (ref 1–4)
ERYTHROCYTE [DISTWIDTH] IN BLOOD BY AUTOMATED COUNT: 14.4 % (ref 11.8–14.4)
HCT VFR BLD AUTO: 36.3 % (ref 36.3–47.1)
HGB BLD-MCNC: 11.7 G/DL (ref 11.9–15.1)
IMM GRANULOCYTES # BLD AUTO: <0.03 K/UL (ref 0–0.3)
IMM GRANULOCYTES NFR BLD: 0 %
IRON SERPL-MCNC: 38 UG/DL (ref 37–145)
LYMPHOCYTES NFR BLD: 1.54 K/UL (ref 1.1–3.7)
LYMPHOCYTES RELATIVE PERCENT: 25 % (ref 24–43)
MCH RBC QN AUTO: 26.8 PG (ref 25.2–33.5)
MCHC RBC AUTO-ENTMCNC: 32.2 G/DL (ref 25.2–33.5)
MCV RBC AUTO: 83.3 FL (ref 82.6–102.9)
MONOCYTES NFR BLD: 0.49 K/UL (ref 0.1–1.2)
MONOCYTES NFR BLD: 8 % (ref 3–12)
NEUTROPHILS NFR BLD: 63 % (ref 36–65)
NEUTS SEG NFR BLD: 3.96 K/UL (ref 1.5–8.1)
NRBC BLD-RTO: 0 PER 100 WBC
PLATELET # BLD AUTO: 255 K/UL (ref 138–453)
PMV BLD AUTO: 9.6 FL (ref 8.1–13.5)
RBC # BLD AUTO: 4.36 M/UL (ref 3.95–5.11)
WBC OTHER # BLD: 6.2 K/UL (ref 3.5–11.3)

## 2024-06-03 PROCEDURE — 36415 COLL VENOUS BLD VENIPUNCTURE: CPT

## 2024-06-03 PROCEDURE — 83540 ASSAY OF IRON: CPT

## 2024-06-03 PROCEDURE — 85025 COMPLETE CBC W/AUTO DIFF WBC: CPT

## 2024-06-11 ENCOUNTER — OUTSIDE SERVICES (OUTPATIENT)
Dept: INTERNAL MEDICINE | Age: 64
End: 2024-06-11
Payer: MEDICARE

## 2024-06-11 DIAGNOSIS — B35.1 ONYCHOMYCOSIS: Primary | ICD-10-CM

## 2024-06-11 PROCEDURE — 99307 SBSQ NF CARE SF MDM 10: CPT | Performed by: NURSE PRACTITIONER

## 2024-06-11 NOTE — PROGRESS NOTES
packs/day: 1 pack/day for 10.0 years (10.0 ttl pk-yrs)     Types: Cigarettes     Start date:      Quit date:      Years since quittin.4    Smokeless tobacco: Never   Vaping Use    Vaping Use: Never used   Substance and Sexual Activity    Alcohol use: No     Alcohol/week: 0.0 standard drinks of alcohol    Drug use: No    Sexual activity: Not on file   Other Topics Concern    Not on file   Social History Narrative    Not on file     Social Determinants of Health     Financial Resource Strain: Not on file   Food Insecurity: No Food Insecurity (2023)    Hunger Vital Sign     Worried About Running Out of Food in the Last Year: Never true     Ran Out of Food in the Last Year: Never true   Transportation Needs: No Transportation Needs (2023)    PRAPARE - Transportation     Lack of Transportation (Medical): No     Lack of Transportation (Non-Medical): No   Physical Activity: Not on file   Stress: Not on file   Social Connections: Not on file   Intimate Partner Violence: Not on file   Housing Stability: Low Risk  (2023)    Housing Stability Vital Sign     Unable to Pay for Housing in the Last Year: No     Number of Places Lived in the Last Year: 1     Unstable Housing in the Last Year: No       Review of Systems   Constitutional:         As noted in HPI otherwise negative       Physical Exam  Vitals and nursing note reviewed.   Constitutional:       General: She is not in acute distress.     Appearance: She is well-developed. She is obese. She is not diaphoretic.   HENT:      Head: Normocephalic and atraumatic.      Right Ear: External ear normal.      Left Ear: External ear normal.   Eyes:      General:         Right eye: No discharge.         Left eye: No discharge.   Neck:      Trachea: No tracheal deviation.   Cardiovascular:      Rate and Rhythm: Normal rate and regular rhythm.      Pulses: Normal pulses.      Heart sounds: Normal heart sounds. No murmur heard.  Pulmonary:      Effort:

## 2024-07-03 ENCOUNTER — OFFICE VISIT (OUTPATIENT)
Dept: UROLOGY | Age: 64
End: 2024-07-03
Payer: MEDICARE

## 2024-07-03 VITALS
HEIGHT: 65 IN | SYSTOLIC BLOOD PRESSURE: 110 MMHG | HEART RATE: 90 BPM | BODY MASS INDEX: 51.81 KG/M2 | DIASTOLIC BLOOD PRESSURE: 60 MMHG

## 2024-07-03 DIAGNOSIS — N32.81 OVERACTIVE BLADDER: Primary | ICD-10-CM

## 2024-07-03 DIAGNOSIS — N39.41 URGE INCONTINENCE: ICD-10-CM

## 2024-07-03 DIAGNOSIS — N39.0 RECURRENT UTI: ICD-10-CM

## 2024-07-03 DIAGNOSIS — R35.0 URINARY FREQUENCY: ICD-10-CM

## 2024-07-03 DIAGNOSIS — R35.1 NOCTURIA: ICD-10-CM

## 2024-07-03 PROBLEM — I50.22 CHRONIC SYSTOLIC (CONGESTIVE) HEART FAILURE (HCC): Status: ACTIVE | Noted: 2024-07-03

## 2024-07-03 PROCEDURE — 99215 OFFICE O/P EST HI 40 MIN: CPT | Performed by: UROLOGY

## 2024-07-03 RX ORDER — TOLTERODINE TARTRATE 2 MG/1
2 TABLET, EXTENDED RELEASE ORAL DAILY
COMMUNITY

## 2024-07-03 RX ORDER — CARVEDILOL 25 MG/1
25 TABLET ORAL DAILY
COMMUNITY

## 2024-07-03 RX ORDER — FERROUS SULFATE 325(65) MG
325 TABLET, DELAYED RELEASE (ENTERIC COATED) ORAL DAILY
COMMUNITY

## 2024-07-03 RX ORDER — VIBEGRON 75 MG/1
75 TABLET, FILM COATED ORAL DAILY
COMMUNITY

## 2024-07-03 NOTE — PATIENT INSTRUCTIONS
PRE-ADMISSION SURGERY INSTRUCTIONS    1. If you should develop a cold, sore throat, cough, fever or other new indication of illness prior to the scheduled surgery, please notify the Doctor's office as early as possible.    2. DO NOT EAT OR DRINK ANYTHING AFTER MIDNIGHT THE NIGHT BEFORE YOUR SURGERY. THIS INCLUDES WATER, TEA, JUICE, CEREAL, COFFEE, ETC. THIS INSTRUCTION MUST BE FOLLOWED IF SURGERY IS TO BE DONE.    3. Refrain from smoking the day of your surgery or procedure.    4. Wear simple loose clothing, which can be easily changed.    5. Do not wear any make-up, lipstick, or nail polish.    6. Please leave contact lenses at home or bring container for them.    7. Leave jewelry (including rings) and other valuables at home.    8. MAKE ARRANGEMENTS FOR A FAMILY MEMBER OR FRIEND TO DRIVE YOU TO AND FROM THE SURGERY CENTER. The anesthetic may affect your judgment following surgery and driving a vehicle within 24 hours after the anesthesia could be dangerous. Please remember that a responsible adult must remain in the building at all times during your surgery.    9. Please shower or bathe both the evening prior to surgery and on the morning of surgery using antibacterial soap. Please use hibaclens to the surgical area and let it sit for five minutes before rinsing both times also.     10. Take no ASPIRIN, VITAMIN OR HERBAL SUPPLEMENTS for ONE WEEK prior to surgery.    11. The morning of your procedure, please take the following medications with a sip of water:________________________________      DAY OF PROCEDURE REPORT TO THE [facility] REGISTRATION OFFICE ON THE FIRST FLOOR.    PROCEDURE DATE:_____________ ARRIVAL TIME:_________________

## 2024-07-03 NOTE — PROGRESS NOTES
Anup Alford MD  Urology Clinic Consultation / New Patient Visit    Patient:  Vane Hickey  YOB: 1960  Date: 7/3/2024    HISTORY OF PRESENT ILLNESS:   The patient is a 63 y.o. female who presents today for evaluation of the following problems: OAB, rec UTIs, UUI  Overall the problem(s) : are worsening.  Associated Symptoms: No dysuria, gross hematuria.  Pain Severity:      Summary of old records: no prev  hx  (Patient's old records, notes and chart reviewed and summarized above.)    UUI and Nocturia- worsening  No alleviating factors  Location: bladder  Aggravating factors: Stroke 2019  2 UTIs over past 2 months  No previous meds  Denies prev bladder surgeries, no hematuria, no kidney stones      Urinalysis today:  No results found for this visit on 24.    Last BUN and creatinine:  Lab Results   Component Value Date    BUN 17 2024     Lab Results   Component Value Date    CREATININE 0.7 2024       Imaging Reviewed during this Office Visit:   (results were independently reviewed by physician and radiology report verified)    PAST MEDICAL, FAMILY AND SOCIAL HISTORY:  Past Medical History:   Diagnosis Date    Arrhythmia     Chronic pain     Edema     GERD (gastroesophageal reflux disease)     Hypertension     Stroke (cerebrum) (HCC) 2019    Urticaria      Past Surgical History:   Procedure Laterality Date    CARPAL TUNNEL RELEASE Bilateral      SECTION      x 3    CHOLECYSTECTOMY      COLONOSCOPY      COLONOSCOPY  2018    [place]    UPPER GASTROINTESTINAL ENDOSCOPY       Family History   Problem Relation Age of Onset    High Blood Pressure Mother     Arthritis Sister     Cancer Brother         colon cancer    Other Daughter         bipolar disorder     Outpatient Medications Marked as Taking for the 7/3/24 encounter (Office Visit) with Anup Alford MD   Medication Sig Dispense Refill    carvedilol (COREG) 25 MG tablet Take 1 tablet by mouth daily

## 2024-07-15 ENCOUNTER — OFFICE VISIT (OUTPATIENT)
Dept: CARDIOLOGY | Age: 64
End: 2024-07-15
Payer: MEDICARE

## 2024-07-15 VITALS — DIASTOLIC BLOOD PRESSURE: 78 MMHG | HEART RATE: 73 BPM | SYSTOLIC BLOOD PRESSURE: 128 MMHG

## 2024-07-15 DIAGNOSIS — I50.22 CHRONIC SYSTOLIC (CONGESTIVE) HEART FAILURE (HCC): ICD-10-CM

## 2024-07-15 DIAGNOSIS — Z01.810 PREOP CARDIOVASCULAR EXAM: ICD-10-CM

## 2024-07-15 DIAGNOSIS — R00.2 INTERMITTENT PALPITATIONS: ICD-10-CM

## 2024-07-15 DIAGNOSIS — I69.30 HISTORY OF CEREBROVASCULAR ACCIDENT (CVA) WITH RESIDUAL DEFICIT: Primary | ICD-10-CM

## 2024-07-15 DIAGNOSIS — Z86.73 H/O: STROKE: ICD-10-CM

## 2024-07-15 PROCEDURE — 3074F SYST BP LT 130 MM HG: CPT | Performed by: INTERNAL MEDICINE

## 2024-07-15 PROCEDURE — 3017F COLORECTAL CA SCREEN DOC REV: CPT | Performed by: INTERNAL MEDICINE

## 2024-07-15 PROCEDURE — G8427 DOCREV CUR MEDS BY ELIG CLIN: HCPCS | Performed by: INTERNAL MEDICINE

## 2024-07-15 PROCEDURE — 3078F DIAST BP <80 MM HG: CPT | Performed by: INTERNAL MEDICINE

## 2024-07-15 PROCEDURE — 99214 OFFICE O/P EST MOD 30 MIN: CPT | Performed by: INTERNAL MEDICINE

## 2024-07-15 PROCEDURE — G8417 CALC BMI ABV UP PARAM F/U: HCPCS | Performed by: INTERNAL MEDICINE

## 2024-07-15 PROCEDURE — 93005 ELECTROCARDIOGRAM TRACING: CPT | Performed by: INTERNAL MEDICINE

## 2024-07-15 PROCEDURE — 1036F TOBACCO NON-USER: CPT | Performed by: INTERNAL MEDICINE

## 2024-07-15 PROCEDURE — 93010 ELECTROCARDIOGRAM REPORT: CPT | Performed by: INTERNAL MEDICINE

## 2024-07-15 NOTE — PROGRESS NOTES
[facility]  [facility] CARDIOLOGY A DEPARTMENT OF [facility]  [address] [facility] [address]  Dept: [phone]      CC; HTN , CVA , Pre-op   Subjective:  The patient is a 63 y.o. year old, , female is in the office for a follow up visit.  Patient is from nursing home on wheelchair  According to her right side is paralyzed unable to walk up weight on it  Patient is here for preop but according to her she does not know what kind of procedure she is going   She denies any chest pain or discomfort. No orthopnea or PND. Denies any palpitation, dizziness or syncope. She is completely asymptomatic from cardiac stand point.    Past Medical History:   has a past medical history of Arrhythmia, Chronic pain, Edema, GERD (gastroesophageal reflux disease), Hypertension, Stroke (cerebrum) (HCC), and Urticaria.    Past Surgical History:   has a past surgical history that includes Colonoscopy; Carpal tunnel release (Bilateral);  section; Upper gastrointestinal endoscopy; Cholecystectomy; and Colonoscopy (2018).    Home Medications:  Prior to Admission medications    Medication Sig Start Date End Date Taking? Authorizing Provider   carvedilol (COREG) 25 MG tablet Take 1 tablet by mouth daily   Yes ProviderAbrahan MD   ferrous sulfate (FE TABS 325) 325 (65 Fe) MG EC tablet Take 1 tablet by mouth daily   Yes ProviderAbrahan MD   Lactobacillus (PROBIOTIC ACIDOPHILUS PO) Take 1 tablet by mouth daily   Yes ProviderAbrahan MD   carvedilol (COREG) 12.5 MG tablet Take 2 tablets by mouth 2 times daily Take 25 in am and 12.5 pm  Patient taking differently: Take 1 tablet by mouth daily Take 25 in am and 12.5 pm 24  Yes Zoë Dotson APRN - NP   polyethylene glycol (GLYCOLAX) 17 GM/SCOOP powder Take 17 g by mouth daily   Yes ProviderAbrahan MD   hydrALAZINE (APRESOLINE) 50 MG tablet Take 1 tablet by mouth 3

## 2024-07-16 ENCOUNTER — TELEPHONE (OUTPATIENT)
Dept: UROLOGY | Age: 64
End: 2024-07-16

## 2024-07-16 NOTE — TELEPHONE ENCOUNTER
[facility]     Pre-Operative Evaluation/Consultation    Name:  Vane Hickey                                         Age:  63 y.o.  MRN:  [id number]       :  1960   Date:  2024         Sex: female    There were no encounter diagnoses.    Surgeon:  Dr. Alford  Procedure (Planned):  Cystoscopy Botox injection 100units  Date Scheduled surgery: 24    Attending : No att. providers found    Primary Physician: Deneen YANG-CNP  Cardiologist: Dr. Haider    Type of Anesthesia Requested: Monitored Anesthesia Care    Patient Medical history:  Allergies   Allergen Reactions    Latex Rash    Amoxicillin     Ciprofloxacin     Codeine     Demerol Hcl [Meperidine]     Docusate Hives    Metoprolol Succinate [Metoprolol]     Norvasc [Amlodipine]     Omeprazole     Prednisone     Propofol     Senna     Sulfa Antibiotics     Tomato     Ultram [Tramadol Hcl]     Iv [Iodides] Rash     Pt states got hot spots with rash.    Lisinopril Rash    Losartan Rash    Z-Randall [Azithromycin] Anxiety     Social History     Tobacco Use    Smoking status: Former     Current packs/day: 0.00     Average packs/day: 1 pack/day for 10.0 years (10.0 ttl pk-yrs)     Types: Cigarettes     Start date:      Quit date:      Years since quittin.5    Smokeless tobacco: Never   Vaping Use    Vaping Use: Never used   Substance Use Topics    Alcohol use: No     Alcohol/week: 0.0 standard drinks of alcohol    Drug use: No         Additional ordered pre-operative testing:  []CBC    []ABG      [] BMP   []URINALYSIS   []CMP    []HCG   []COAGS PT/INR  []T&C  []LFTs   []TYPE AND SCREEN    [] EKG  [] Chest X-Ray  [] Other Radiology    [] Sent to Hospitalist None  [] Sent to Anesthesia for your review: None   [] Additional Orders: None     Comments:None   Requests: No Special requests    Signed: Sylvia Barksdale LPN 2024 10:18 AM

## 2024-07-31 ENCOUNTER — ANESTHESIA EVENT (OUTPATIENT)
Dept: OPERATING ROOM | Age: 64
End: 2024-07-31
Payer: MEDICARE

## 2024-07-31 ENCOUNTER — HOSPITAL ENCOUNTER (OUTPATIENT)
Age: 64
Setting detail: OUTPATIENT SURGERY
Discharge: HOME | End: 2024-07-31
Attending: UROLOGY | Admitting: UROLOGY
Payer: MEDICARE

## 2024-07-31 ENCOUNTER — ANESTHESIA (OUTPATIENT)
Dept: OPERATING ROOM | Age: 64
End: 2024-07-31
Payer: MEDICARE

## 2024-07-31 VITALS
HEART RATE: 72 BPM | RESPIRATION RATE: 16 BRPM | OXYGEN SATURATION: 96 % | HEIGHT: 65 IN | SYSTOLIC BLOOD PRESSURE: 145 MMHG | WEIGHT: 270 LBS | BODY MASS INDEX: 44.98 KG/M2 | TEMPERATURE: 97.8 F | DIASTOLIC BLOOD PRESSURE: 72 MMHG

## 2024-07-31 PROCEDURE — 6360000002 HC RX W HCPCS: Performed by: UROLOGY

## 2024-07-31 PROCEDURE — 3700000001 HC ADD 15 MINUTES (ANESTHESIA): Performed by: UROLOGY

## 2024-07-31 PROCEDURE — 2709999900 HC NON-CHARGEABLE SUPPLY: Performed by: UROLOGY

## 2024-07-31 PROCEDURE — 2580000003 HC RX 258: Performed by: UROLOGY

## 2024-07-31 PROCEDURE — 7100000011 HC PHASE II RECOVERY - ADDTL 15 MIN: Performed by: UROLOGY

## 2024-07-31 PROCEDURE — 3700000000 HC ANESTHESIA ATTENDED CARE: Performed by: UROLOGY

## 2024-07-31 PROCEDURE — 7100000010 HC PHASE II RECOVERY - FIRST 15 MIN: Performed by: UROLOGY

## 2024-07-31 PROCEDURE — 3600000002 HC SURGERY LEVEL 2 BASE: Performed by: UROLOGY

## 2024-07-31 PROCEDURE — 3600000012 HC SURGERY LEVEL 2 ADDTL 15MIN: Performed by: UROLOGY

## 2024-07-31 PROCEDURE — 2500000003 HC RX 250 WO HCPCS: Performed by: NURSE ANESTHETIST, CERTIFIED REGISTERED

## 2024-07-31 PROCEDURE — 6360000002 HC RX W HCPCS: Performed by: NURSE ANESTHETIST, CERTIFIED REGISTERED

## 2024-07-31 RX ORDER — SODIUM CHLORIDE 0.9 % (FLUSH) 0.9 %
5-40 SYRINGE (ML) INJECTION EVERY 12 HOURS SCHEDULED
Status: DISCONTINUED | OUTPATIENT
Start: 2024-07-31 | End: 2024-07-31 | Stop reason: HOSPADM

## 2024-07-31 RX ORDER — SODIUM CHLORIDE 9 MG/ML
INJECTION, SOLUTION INTRAVENOUS PRN
Status: DISCONTINUED | OUTPATIENT
Start: 2024-07-31 | End: 2024-07-31 | Stop reason: HOSPADM

## 2024-07-31 RX ORDER — PHENAZOPYRIDINE HYDROCHLORIDE 200 MG/1
200 TABLET, FILM COATED ORAL 3 TIMES DAILY PRN
Qty: 9 TABLET | Refills: 0 | Status: SHIPPED | OUTPATIENT
Start: 2024-07-31

## 2024-07-31 RX ORDER — PROPOFOL 10 MG/ML
INJECTION, EMULSION INTRAVENOUS PRN
Status: DISCONTINUED | OUTPATIENT
Start: 2024-07-31 | End: 2024-07-31 | Stop reason: SDUPTHER

## 2024-07-31 RX ORDER — NITROFURANTOIN 25; 75 MG/1; MG/1
100 CAPSULE ORAL 2 TIMES DAILY
Qty: 14 CAPSULE | Refills: 0 | Status: SHIPPED | OUTPATIENT
Start: 2024-07-31

## 2024-07-31 RX ORDER — SODIUM CHLORIDE, SODIUM LACTATE, POTASSIUM CHLORIDE, CALCIUM CHLORIDE 600; 310; 30; 20 MG/100ML; MG/100ML; MG/100ML; MG/100ML
INJECTION, SOLUTION INTRAVENOUS CONTINUOUS
Status: DISCONTINUED | OUTPATIENT
Start: 2024-07-31 | End: 2024-07-31 | Stop reason: HOSPADM

## 2024-07-31 RX ORDER — LIDOCAINE HYDROCHLORIDE 10 MG/ML
INJECTION, SOLUTION EPIDURAL; INFILTRATION; INTRACAUDAL; PERINEURAL PRN
Status: DISCONTINUED | OUTPATIENT
Start: 2024-07-31 | End: 2024-07-31 | Stop reason: SDUPTHER

## 2024-07-31 RX ORDER — SODIUM CHLORIDE 0.9 % (FLUSH) 0.9 %
5-40 SYRINGE (ML) INJECTION PRN
Status: DISCONTINUED | OUTPATIENT
Start: 2024-07-31 | End: 2024-07-31 | Stop reason: HOSPADM

## 2024-07-31 RX ADMIN — SODIUM CHLORIDE, POTASSIUM CHLORIDE, SODIUM LACTATE AND CALCIUM CHLORIDE: 600; 310; 30; 20 INJECTION, SOLUTION INTRAVENOUS at 10:16

## 2024-07-31 RX ADMIN — SODIUM CHLORIDE 3000 MG: 900 INJECTION INTRAVENOUS at 10:46

## 2024-07-31 RX ADMIN — PROPOFOL 125 MG: 10 INJECTION, EMULSION INTRAVENOUS at 10:45

## 2024-07-31 RX ADMIN — LIDOCAINE HYDROCHLORIDE 50 MG: 10 INJECTION, SOLUTION EPIDURAL; INFILTRATION; INTRACAUDAL; PERINEURAL at 10:45

## 2024-07-31 RX ADMIN — PROPOFOL 180 MCG/KG/MIN: 10 INJECTION, EMULSION INTRAVENOUS at 10:46

## 2024-07-31 ASSESSMENT — PAIN SCALES - GENERAL
PAINLEVEL_OUTOF10: 0

## 2024-07-31 ASSESSMENT — PAIN - FUNCTIONAL ASSESSMENT: PAIN_FUNCTIONAL_ASSESSMENT: 0-10

## 2024-07-31 NOTE — DISCHARGE INSTRUCTIONS
sweating.  -Any redness or swelling at the IV site.  -Persistant nausea or vomiting  -Severe abdominal or chest pain  -You are unable to urinate within 12 hours.    If you have any questions or problems, call:  [phone] ([facility]) or after hours call: [phone] ([facility]).

## 2024-07-31 NOTE — ANESTHESIA PRE PROCEDURE
Results   Component Value Date/Time    WBC 6.2 06/03/2024 06:56 AM    RBC 4.36 06/03/2024 06:56 AM    HGB 11.7 06/03/2024 06:56 AM    HCT 36.3 06/03/2024 06:56 AM    MCV 83.3 06/03/2024 06:56 AM    RDW 14.4 06/03/2024 06:56 AM     06/03/2024 06:56 AM       CMP:   Lab Results   Component Value Date/Time     05/08/2024 07:06 AM    K 3.8 05/08/2024 07:06 AM    K 3.6 01/29/2024 01:58 PM    CL 99 05/08/2024 07:06 AM    CO2 32 05/08/2024 07:06 AM    BUN 17 05/08/2024 07:06 AM    CREATININE 0.7 05/08/2024 07:06 AM    CREATININE 0.6 02/05/2019 12:00 AM    GFRAA >60 01/16/2020 08:15 AM    LABGLOM >90 05/08/2024 07:06 AM    LABGLOM >60 01/29/2024 01:58 PM    GLUCOSE 104 05/08/2024 07:06 AM    CALCIUM 9.3 05/08/2024 07:06 AM    BILITOT 0.6 01/29/2024 01:58 PM    ALKPHOS 177 01/29/2024 01:58 PM    AST 67 01/29/2024 01:58 PM    ALT 43 01/29/2024 01:58 PM       POC Tests: No results for input(s): \"POCGLU\", \"POCNA\", \"POCK\", \"POCCL\", \"POCBUN\", \"POCHEMO\", \"POCHCT\" in the last 72 hours.    Coags: No results found for: \"PROTIME\", \"INR\", \"APTT\"    HCG (If Applicable): No results found for: \"PREGTESTUR\", \"PREGSERUM\", \"HCG\", \"HCGQUANT\"     ABGs: No results found for: \"PHART\", \"PO2ART\", \"EOF8DKD\", \"AEU8ASL\", \"BEART\", \"H1XPBTJS\"     Type & Screen (If Applicable):  No results found for: \"LABABO\"    Drug/Infectious Status (If Applicable):  No results found for: \"HIV\", \"HEPCAB\"    COVID-19 Screening (If Applicable):   Lab Results   Component Value Date/Time    COVID19 NOT DETECTED 12/07/2023 05:10 PM           Anesthesia Evaluation  Patient summary reviewed  Airway: Mallampati: III  TM distance: >3 FB     Mouth opening: > = 3 FB   Dental:          Pulmonary:normal exam    (+)     sleep apnea:                                  Cardiovascular:    (+) hypertension:, CAD:, CHF: systolic      ECG reviewed      Echocardiogram reviewed                  Neuro/Psych:   (+) CVA:depression/anxiety             GI/Hepatic/Renal:   (+) GERD:,

## 2024-07-31 NOTE — OP NOTE
Operative Note      Patient: Vane Hickey  YOB: 1960  MRN: [id number]    Date of Procedure: 7/31/2024    Pre-Op Diagnosis Codes:     * Urinary incontinence, unspecified type [R32]    Post-Op Diagnosis: Same       Procedure(s):  Cystoscopy Botox [id number] units    Surgeon(s):  Anup Alford MD    Assistant:   * No surgical staff found *    Anesthesia: Monitor Anesthesia Care    Estimated Blood Loss (mL): Minimal    Complications: None    Specimens:   * No specimens in log *    Implants:  * No implants in log *      Drains: * No LDAs found *    Findings:  Infection Present At Time Of Surgery (PATOS) (choose all levels that have infection present):  No infection present      Detailed Description of Procedure:        Indications: Vane Hickey is a 63 y.o. female with history of overactive bladder. The patient is here for Cystoscopy Botox injection.   Risks benefits alternatives goals and possible complications of the procedure were explained to the patient and informed consent was obtained he elected to proceed.     Details of procedure: The patient was brought back to the operating room.  The genitals were prepped and draped in usual sterile surgical fashion after being placed in dorsal lithotomy position.  A timeout was taken per protocol with everyone in agreement.  A cystoscope was assembled and passed per the urethra. The urethra was normal without any lesions. The bladder was entered with ease and inspected thoroughly and systematically which did not show any lesions or tumors, stone, fistulous tracts, diverticula. Both ureteral orifices were normal with clear efflux of urine. At this time we mixed 100 units of botox, and injected the solution into the bladder wall with 0.5 ml increments at each site along the posterior bladder wall. Hemostasis was persistent. The trigone and ureteral orifices were avoided.  The patient tolerated the procedure well. The scope was removed intact and

## 2024-07-31 NOTE — H&P
History and Physical    Patient:  Vane Hickey  MRN: [id number]  YOB: 1960    CHIEF COMPLAINT:  OAB    HISTORY OF PRESENT ILLNESS:   The patient is a 63 y.o. female who presents with as above, here for surgery    Patient's old records, notes and chart reviewed and summarized above.     Past Medical History:    Past Medical History:   Diagnosis Date    Arrhythmia     Chronic pain     CVA (cerebral vascular accident) ([facility])     Edema     GERD (gastroesophageal reflux disease)     Hypertension     Stroke (cerebrum) ([facility])     Urticaria        Past Surgical History:    Past Surgical History:   Procedure Laterality Date    CARPAL TUNNEL RELEASE Bilateral      SECTION      x 3    CHOLECYSTECTOMY      COLONOSCOPY      COLONOSCOPY  2018    [place]    UPPER GASTROINTESTINAL ENDOSCOPY       Medications Prior to Admission:    Prior to Admission medications    Medication Sig Start Date End Date Taking? Authorizing Provider   carvedilol (COREG) 25 MG tablet Take 1 tablet by mouth daily    Abrahan Ward MD   ELDERBERRY PO Take 2 tablets by mouth daily 2 gummies    Abrahan Ward MD   ferrous sulfate (FE TABS 325) 325 (65 Fe) MG EC tablet Take 1 tablet by mouth daily    Abrahan Ward MD   vibegron (GEMTESA) 75 MG TABS tablet Take 1 tablet by mouth daily    Abrahan Ward MD   Lactobacillus (PROBIOTIC ACIDOPHILUS PO) Take 1 tablet by mouth daily    Abrahan Ward MD   tolterodine (DETROL) 2 MG tablet Take 1 tablet by mouth daily    Abrahan Ward MD   carvedilol (COREG) 12.5 MG tablet Take 2 tablets by mouth 2 times daily Take 25 in am and 12.5 pm  Patient taking differently: Take 1 tablet by mouth daily Take 25 in am and 12.5 pm 24   Zoë Dotson, APRN - NP   polyethylene glycol (GLYCOLAX) 17 GM/SCOOP powder Take 17 g by mouth daily    Abrahan Ward MD   dextromethorphan (DELSYM) 30 MG/5ML extended release liquid Take 10 mLs by mouth 2

## 2024-07-31 NOTE — ANESTHESIA POSTPROCEDURE EVALUATION
Department of Anesthesiology  Postprocedure Note    Patient: Vane Hickey  MRN: [id number]  YOB: 1960  Date of evaluation: 7/31/2024    Procedure Summary       Date: 07/31/24 Room / Location: [address]    Anesthesia Start: 1043 Anesthesia Stop: 1119    Procedure: Cystoscopy Botox [id number] units (Bladder) Diagnosis:       Urinary incontinence, unspecified type      (Urinary incontinence, unspecified type [R32])    Surgeons: Anup Alford MD Responsible Provider: Jef Eubanks II, APRN - CRNA    Anesthesia Type: MAC, TIVA ASA Status: 3            Anesthesia Type: MAC, TIVA    Jonathan Phase I: Jonathan Score: 10    Jonathan Phase II: Jonathan Score: 9    Anesthesia Post Evaluation    Patient location during evaluation: bedside  Patient participation: complete - patient participated  Level of consciousness: awake  Pain score: 0  Airway patency: patent  Nausea & Vomiting: no nausea and no vomiting  Cardiovascular status: hemodynamically stable  Respiratory status: spontaneous ventilation  Hydration status: stable  Pain management: satisfactory to patient    No notable events documented.

## 2024-08-11 ENCOUNTER — HOSPITAL ENCOUNTER (OUTPATIENT)
Age: 64
Setting detail: SPECIMEN
Discharge: HOME | End: 2024-08-11
Payer: MEDICARE

## 2024-08-11 PROCEDURE — 87086 URINE CULTURE/COLONY COUNT: CPT

## 2024-08-11 PROCEDURE — 87186 SC STD MICRODIL/AGAR DIL: CPT

## 2024-08-11 PROCEDURE — 81001 URINALYSIS AUTO W/SCOPE: CPT

## 2024-08-11 PROCEDURE — 87088 URINE BACTERIA CULTURE: CPT

## 2024-08-12 LAB
BACTERIA URNS QL MICRO: ABNORMAL
BILIRUB UR QL STRIP: NEGATIVE
CLARITY UR: ABNORMAL
COLOR UR: ABNORMAL
COMMENT: ABNORMAL
EPI CELLS #/AREA URNS HPF: ABNORMAL /HPF (ref 0–5)
GLUCOSE UR STRIP-MCNC: ABNORMAL MG/DL
HGB UR QL STRIP.AUTO: NEGATIVE
KETONES UR STRIP-MCNC: NEGATIVE MG/DL
LEUKOCYTE ESTERASE UR QL STRIP: ABNORMAL
NITRITE UR QL STRIP: POSITIVE
PH UR STRIP: 7 [PH] (ref 5–6)
PROT UR STRIP-MCNC: ABNORMAL MG/DL
RBC #/AREA URNS HPF: ABNORMAL /HPF (ref 0–4)
SP GR UR STRIP: 1.02 (ref 1.01–1.02)
UROBILINOGEN UR STRIP-ACNC: NORMAL EU/DL (ref 0–1)
WBC #/AREA URNS HPF: ABNORMAL /HPF (ref 0–4)

## 2024-08-13 LAB
MICROORGANISM SPEC CULT: ABNORMAL
SPECIMEN DESCRIPTION: ABNORMAL

## 2024-09-04 RX ORDER — ROPINIROLE 0.25 MG/1
0.25 TABLET, FILM COATED ORAL NIGHTLY
Qty: 30 TABLET | Refills: 3
Start: 2024-09-04

## 2024-09-05 ENCOUNTER — OFFICE VISIT (OUTPATIENT)
Dept: UROLOGY | Age: 64
End: 2024-09-05
Payer: MEDICARE

## 2024-09-05 VITALS — BODY MASS INDEX: 45.63 KG/M2 | HEIGHT: 65 IN | DIASTOLIC BLOOD PRESSURE: 70 MMHG | SYSTOLIC BLOOD PRESSURE: 126 MMHG

## 2024-09-05 DIAGNOSIS — N39.41 URGE INCONTINENCE: ICD-10-CM

## 2024-09-05 DIAGNOSIS — N39.0 RECURRENT UTI: ICD-10-CM

## 2024-09-05 DIAGNOSIS — R35.1 NOCTURIA: ICD-10-CM

## 2024-09-05 DIAGNOSIS — N32.81 OVERACTIVE BLADDER: Primary | ICD-10-CM

## 2024-09-05 PROCEDURE — 3078F DIAST BP <80 MM HG: CPT

## 2024-09-05 PROCEDURE — 3074F SYST BP LT 130 MM HG: CPT

## 2024-09-05 PROCEDURE — 1036F TOBACCO NON-USER: CPT

## 2024-09-05 PROCEDURE — 51798 US URINE CAPACITY MEASURE: CPT

## 2024-09-05 PROCEDURE — G8427 DOCREV CUR MEDS BY ELIG CLIN: HCPCS

## 2024-09-05 PROCEDURE — 99214 OFFICE O/P EST MOD 30 MIN: CPT

## 2024-09-05 PROCEDURE — 1090F PRES/ABSN URINE INCON ASSESS: CPT

## 2024-09-05 PROCEDURE — 99213 OFFICE O/P EST LOW 20 MIN: CPT

## 2024-09-05 PROCEDURE — 1123F ACP DISCUSS/DSCN MKR DOCD: CPT

## 2024-09-05 PROCEDURE — G8417 CALC BMI ABV UP PARAM F/U: HCPCS

## 2024-09-05 PROCEDURE — 0509F URINE INCON PLAN DOCD: CPT

## 2024-09-05 PROCEDURE — PBSHW MEAS,POST-VOID RES,US,NON-IMAGING

## 2024-09-05 PROCEDURE — G8400 PT W/DXA NO RESULTS DOC: HCPCS

## 2024-09-05 PROCEDURE — 3017F COLORECTAL CA SCREEN DOC REV: CPT

## 2024-09-05 RX ORDER — VIBEGRON 75 MG/1
75 TABLET, FILM COATED ORAL DAILY
Qty: 30 TABLET | Refills: 5 | Status: SHIPPED | OUTPATIENT
Start: 2024-09-05

## 2024-09-05 RX ORDER — FESOTERODINE FUMARATE 4 MG/1
4 TABLET, FILM COATED, EXTENDED RELEASE ORAL DAILY
COMMUNITY

## 2024-09-05 NOTE — PROGRESS NOTES
[facility], [facility], [place], operated by [facility] UROLOG[facility] A DEPARTMENT OF [facility]  [address] [facility] [address]  Dept: [phone]  Visit Date: 9/5/2024      HPI:   Vane Hickey is a 64 y.o. female with past medical history as listed below who presents today in follow-up for OAB follow-up.      S/P Cystoscopy, Bladder Botox Injections (100 U) by Dr. Alford on 7/31/24. Patient has done well following her procedure. Urinary urgency, UUI, and frequency are improved but not perfect. Still with nocturia and UUI at night sometimes.    Cranberry + Probiotic for recurrent UTI prevention.    Stroke and Parkinson's Dz history.    Labs   PVR: 99 mL    Current Outpatient Medications   Medication Sig Dispense Refill    fesoterodine (TOVIAZ) 4 MG TB24 ER tablet Take 1 tablet by mouth daily      rOPINIRole (REQUIP) 0.25 MG tablet Take 1 tablet by mouth nightly 30 tablet 3    phenazopyridine (PYRIDIUM) 200 MG tablet Take 1 tablet by mouth 3 times daily as needed for Pain 9 tablet 0    carvedilol (COREG) 25 MG tablet Take 1 tablet by mouth daily      ELDERBERRY PO Take 2 tablets by mouth daily 2 gummies      ferrous sulfate (FE TABS 325) 325 (65 Fe) MG EC tablet Take 1 tablet by mouth daily      vibegron (GEMTESA) 75 MG TABS tablet Take 1 tablet by mouth daily      Lactobacillus (PROBIOTIC ACIDOPHILUS PO) Take 1 tablet by mouth daily      tolterodine (DETROL) 2 MG tablet Take 1 tablet by mouth daily      carvedilol (COREG) 12.5 MG tablet Take 2 tablets by mouth 2 times daily Take 25 in am and 12.5 pm 60 tablet 3    polyethylene glycol (GLYCOLAX) 17 GM/SCOOP powder Take 17 g by mouth daily      dextromethorphan (DELSYM) 30 MG/5ML extended release liquid Take 10 mLs by mouth 2 times daily as needed for Cough      hydrALAZINE (APRESOLINE) 50 MG tablet Take 1 tablet by mouth 3 times daily 90 tablet 3    diclofenac sodium (VOLTAREN) 1 % GEL Apply 2 g topically 4 times

## 2024-09-10 ENCOUNTER — OFFICE VISIT (OUTPATIENT)
Dept: PULMONOLOGY | Age: 64
End: 2024-09-10
Payer: MEDICARE

## 2024-09-10 VITALS
TEMPERATURE: 97.9 F | HEART RATE: 83 BPM | OXYGEN SATURATION: 96 % | SYSTOLIC BLOOD PRESSURE: 120 MMHG | DIASTOLIC BLOOD PRESSURE: 62 MMHG

## 2024-09-10 DIAGNOSIS — G47.33 OSA (OBSTRUCTIVE SLEEP APNEA): Primary | ICD-10-CM

## 2024-09-10 DIAGNOSIS — E66.01 MORBID OBESITY WITH BMI OF 45.0-49.9, ADULT (HCC): ICD-10-CM

## 2024-09-10 PROCEDURE — 1123F ACP DISCUSS/DSCN MKR DOCD: CPT | Performed by: INTERNAL MEDICINE

## 2024-09-10 PROCEDURE — G8400 PT W/DXA NO RESULTS DOC: HCPCS | Performed by: INTERNAL MEDICINE

## 2024-09-10 PROCEDURE — 3074F SYST BP LT 130 MM HG: CPT | Performed by: INTERNAL MEDICINE

## 2024-09-10 PROCEDURE — 3078F DIAST BP <80 MM HG: CPT | Performed by: INTERNAL MEDICINE

## 2024-09-10 PROCEDURE — G8427 DOCREV CUR MEDS BY ELIG CLIN: HCPCS | Performed by: INTERNAL MEDICINE

## 2024-09-10 PROCEDURE — 1090F PRES/ABSN URINE INCON ASSESS: CPT | Performed by: INTERNAL MEDICINE

## 2024-09-10 PROCEDURE — 3017F COLORECTAL CA SCREEN DOC REV: CPT | Performed by: INTERNAL MEDICINE

## 2024-09-10 PROCEDURE — 99204 OFFICE O/P NEW MOD 45 MIN: CPT | Performed by: INTERNAL MEDICINE

## 2024-09-10 PROCEDURE — 1036F TOBACCO NON-USER: CPT | Performed by: INTERNAL MEDICINE

## 2024-09-10 PROCEDURE — G8417 CALC BMI ABV UP PARAM F/U: HCPCS | Performed by: INTERNAL MEDICINE

## 2024-09-10 PROCEDURE — 99214 OFFICE O/P EST MOD 30 MIN: CPT | Performed by: INTERNAL MEDICINE

## 2024-09-10 RX ORDER — CETIRIZINE HYDROCHLORIDE 5 MG/1
5 TABLET ORAL DAILY
COMMUNITY

## 2024-09-12 ENCOUNTER — TELEPHONE (OUTPATIENT)
Dept: PULMONOLOGY | Age: 64
End: 2024-09-12

## 2024-09-17 ENCOUNTER — OUTSIDE SERVICES (OUTPATIENT)
Dept: INTERNAL MEDICINE | Age: 64
End: 2024-09-17

## 2024-09-17 ENCOUNTER — HOSPITAL ENCOUNTER (OUTPATIENT)
Age: 64
Setting detail: SPECIMEN
Discharge: HOME | End: 2024-09-17
Payer: MEDICARE

## 2024-09-17 DIAGNOSIS — G25.81 RLS (RESTLESS LEGS SYNDROME): Primary | ICD-10-CM

## 2024-09-17 PROCEDURE — 87086 URINE CULTURE/COLONY COUNT: CPT

## 2024-09-17 PROCEDURE — 87186 SC STD MICRODIL/AGAR DIL: CPT

## 2024-09-17 PROCEDURE — 87077 CULTURE AEROBIC IDENTIFY: CPT

## 2024-09-17 PROCEDURE — 81001 URINALYSIS AUTO W/SCOPE: CPT

## 2024-09-18 LAB
BACTERIA URNS QL MICRO: ABNORMAL
BILIRUB UR QL STRIP: NEGATIVE
CLARITY UR: ABNORMAL
COLOR UR: YELLOW
EPI CELLS #/AREA URNS HPF: ABNORMAL /HPF (ref 0–5)
GLUCOSE UR STRIP-MCNC: NEGATIVE MG/DL
HGB UR QL STRIP.AUTO: NEGATIVE
KETONES UR STRIP-MCNC: NEGATIVE MG/DL
LEUKOCYTE ESTERASE UR QL STRIP: ABNORMAL
NITRITE UR QL STRIP: POSITIVE
PH UR STRIP: 7 [PH] (ref 5–6)
PROT UR STRIP-MCNC: NEGATIVE MG/DL
RBC #/AREA URNS HPF: ABNORMAL /HPF (ref 0–4)
SP GR UR STRIP: 1.01 (ref 1.01–1.02)
UROBILINOGEN UR STRIP-ACNC: NORMAL EU/DL (ref 0–1)
WBC #/AREA URNS HPF: ABNORMAL /HPF (ref 0–4)

## 2024-09-19 LAB
MICROORGANISM SPEC CULT: ABNORMAL
SPECIMEN DESCRIPTION: ABNORMAL

## 2024-09-30 ENCOUNTER — OUTSIDE SERVICES (OUTPATIENT)
Dept: INTERNAL MEDICINE | Age: 64
End: 2024-09-30
Payer: MEDICARE

## 2024-09-30 DIAGNOSIS — E78.2 MIXED HYPERLIPIDEMIA: ICD-10-CM

## 2024-09-30 DIAGNOSIS — I50.22 CHRONIC SYSTOLIC CONGESTIVE HEART FAILURE (HCC): ICD-10-CM

## 2024-09-30 DIAGNOSIS — I63.332 CEREBROVASCULAR ACCIDENT (CVA) DUE TO THROMBOSIS OF LEFT POSTERIOR CEREBRAL ARTERY (HCC): Primary | ICD-10-CM

## 2024-09-30 DIAGNOSIS — M25.552 CHRONIC LEFT HIP PAIN: ICD-10-CM

## 2024-09-30 DIAGNOSIS — G89.29 CHRONIC PAIN OF RIGHT KNEE: ICD-10-CM

## 2024-09-30 DIAGNOSIS — N32.81 OVERACTIVE BLADDER: ICD-10-CM

## 2024-09-30 DIAGNOSIS — G81.91 RIGHT HEMIPLEGIA (HCC): ICD-10-CM

## 2024-09-30 DIAGNOSIS — G89.29 CHRONIC LEFT HIP PAIN: ICD-10-CM

## 2024-09-30 DIAGNOSIS — E66.01 MORBID OBESITY: ICD-10-CM

## 2024-09-30 DIAGNOSIS — R73.01 ELEVATED FASTING GLUCOSE: ICD-10-CM

## 2024-09-30 DIAGNOSIS — L65.0 TELOGEN EFFLUVIUM: ICD-10-CM

## 2024-09-30 DIAGNOSIS — G47.33 OSA (OBSTRUCTIVE SLEEP APNEA): ICD-10-CM

## 2024-09-30 DIAGNOSIS — I10 PRIMARY HYPERTENSION: ICD-10-CM

## 2024-09-30 DIAGNOSIS — M25.561 CHRONIC PAIN OF RIGHT KNEE: ICD-10-CM

## 2024-09-30 NOTE — PROGRESS NOTES
DR. 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    History of cerebrovascular accident (CVA) with residual deficit    Degenerative joint disease involving multiple joints    Left pontine stroke (HCC)    Right hemiplegia (HCC)    Ex-smoker    H/O: stroke    Numbness and tingling of right upper and lower extremity    Anxiety and depression    Sleep difficulties    Muscle spasm of right lower extremity    Cerebrovascular accident (CVA) due to thrombosis of left posterior cerebral artery (HCC)    Chronic systolic (congestive) heart failure       CHIEF COMPLAINT: Here in transfer from a different [facility] home    HISTORY OF CHIEF COMPLAINT: This 63 y.o.  female was admitted to the [facility] after being transferred from the [facility] after having had a left pontine stroke in January 2023. She transferred here \"so she could get better physical therapy in order to get stronger so she can get back home and live independently.\" She does have a history of elevated fasting glucose that she tries to control with diet and exercise. She takes Lipitor for hyperlipidemia. She does have a history of obstructive sleep apnea, and needs to see pulmonology for that. She has a history of overactive bladder and would like to see urology for that. She sees our cardiologists here for congestive heart failure. She has been having a lot of right knee pain and left hip pain and would like to see orthopedics for that. She also thinks

## 2024-10-06 ENCOUNTER — HOSPITAL ENCOUNTER (OUTPATIENT)
Age: 64
Setting detail: SPECIMEN
Discharge: HOME | End: 2024-10-06
Payer: MEDICARE

## 2024-10-06 PROCEDURE — 87186 SC STD MICRODIL/AGAR DIL: CPT

## 2024-10-06 PROCEDURE — 87077 CULTURE AEROBIC IDENTIFY: CPT

## 2024-10-06 PROCEDURE — 81001 URINALYSIS AUTO W/SCOPE: CPT

## 2024-10-06 PROCEDURE — 87086 URINE CULTURE/COLONY COUNT: CPT

## 2024-10-07 ENCOUNTER — OUTSIDE SERVICES (OUTPATIENT)
Dept: INTERNAL MEDICINE | Age: 64
End: 2024-10-07
Payer: MEDICARE

## 2024-10-07 VITALS — WEIGHT: 293 LBS | BODY MASS INDEX: 51.38 KG/M2

## 2024-10-07 DIAGNOSIS — L30.9 DERMATITIS: Primary | ICD-10-CM

## 2024-10-07 DIAGNOSIS — Z88.9 MULTIPLE ALLERGIES: ICD-10-CM

## 2024-10-07 LAB
BACTERIA URNS QL MICRO: ABNORMAL
BILIRUB UR QL STRIP: NEGATIVE
CLARITY UR: ABNORMAL
COLOR UR: YELLOW
EPI CELLS #/AREA URNS HPF: ABNORMAL /HPF (ref 0–5)
GLUCOSE UR STRIP-MCNC: NEGATIVE MG/DL
HGB UR QL STRIP.AUTO: NEGATIVE
KETONES UR STRIP-MCNC: NEGATIVE MG/DL
LEUKOCYTE ESTERASE UR QL STRIP: ABNORMAL
NITRITE UR QL STRIP: POSITIVE
PH UR STRIP: >=9 [PH] (ref 5–6)
PROT UR STRIP-MCNC: ABNORMAL MG/DL
RBC #/AREA URNS HPF: ABNORMAL /HPF (ref 0–4)
SP GR UR STRIP: 1.01 (ref 1.01–1.02)
UROBILINOGEN UR STRIP-ACNC: NORMAL EU/DL (ref 0–1)
WBC #/AREA URNS HPF: ABNORMAL /HPF (ref 0–4)

## 2024-10-07 PROCEDURE — 99308 SBSQ NF CARE LOW MDM 20: CPT | Performed by: NURSE PRACTITIONER

## 2024-10-07 NOTE — PROGRESS NOTES
10/07/24  Vane Hickey  1960    Patient Resident of [facility]    Chief Complaint  1. Dermatitis    2. Multiple allergies    3. BMI 50.0-59.9, adult        HPI:  64-year-old patient with history of seasonal allergies, morbid obesity being seen for rash to right anticub. -No new exposure to allergens that she is aware of.  States used to have dry scaly areas to the outside of her elbows.  Multiple allergies including prednisone.  Has had hydrocortisone in the past without difficulty.  Oral prednisone caused questionable hives.  Is noticed that her weight is up significant in office chart however she is down 6 pounds from admission at [facility].      Allergies   Allergen Reactions    Latex Rash    Codeine Anaphylaxis and Hives    Demerol Hcl [Meperidine] Anaphylaxis    Amoxicillin Other (See Comments)     Thrush or hives      Ciprofloxacin Other (See Comments)     Thrush or hives      Docusate Hives    Metoprolol Succinate [Metoprolol]      Pt unsure      Norvasc [Amlodipine] Hives    Omeprazole Other (See Comments)     \"Cramping\"    Prednisone Hives    Propofol      Low BP and pt states \"it took 24 hours to come out of anesthesia\"    Senna      Pt unsure      Sulfa Antibiotics Other (See Comments)     Thrush or hives      Ultram [Tramadol Hcl]      Pt unsure      Iv [Iodides] Rash     Pt states got hot spots with rash.    Lisinopril Rash    Losartan Rash    Tomato Rash    Z-Randall [Azithromycin] Anxiety       Past Medical History:   Diagnosis Date    Arrhythmia     Chronic pain     CVA (cerebral vascular accident) ([facility])     Edema     GERD (gastroesophageal reflux disease)     Hypertension     Stroke (cerebrum) ([facility]) 2019    Urticaria        Past Surgical History:   Procedure Laterality Date    CARPAL TUNNEL RELEASE Bilateral      SECTION      x 3    CHOLECYSTECTOMY      COLONOSCOPY      COLONOSCOPY  2018    [place]    CYSTOSCOPY N/A 2024    Cystoscopy Botox [id number] units performed by

## 2024-10-09 LAB
MICROORGANISM SPEC CULT: ABNORMAL
SPECIMEN DESCRIPTION: ABNORMAL

## 2024-10-14 ENCOUNTER — OUTSIDE SERVICES (OUTPATIENT)
Dept: INTERNAL MEDICINE | Age: 64
End: 2024-10-14

## 2024-10-14 DIAGNOSIS — L03.031 PARONYCHIA OF GREAT TOE OF RIGHT FOOT: Primary | ICD-10-CM

## 2024-10-14 DIAGNOSIS — R73.01 ELEVATED FASTING GLUCOSE: ICD-10-CM

## 2024-10-14 DIAGNOSIS — I10 PRIMARY HYPERTENSION: ICD-10-CM

## 2024-10-14 NOTE — PROGRESS NOTES
0 min   Stress: No Stress Concern Present (1/24/2023)    Received from [facility], [facility]    [place] [employer] - Occupational Stress Questionnaire     Feeling of Stress : Not at all   Social Connections: Moderately Integrated (1/24/2023)    Received from [facility], [facility]    Social Connection and Isolation Panel [NHANES]     Frequency of Communication with Friends and Family: Twice a week     Frequency of Social Gatherings with Friends and Family: Twice a week     Attends Restorationism Services: More than 4 times per year     Active Member of Clubs or Organizations: No     Attends Club or Organization Meetings: Never     Marital Status:    Intimate Partner Violence: Unknown (2/22/2024)    Received from [facility], [facility]    [place] Safety & Environment     Fear of Current or Ex-Partner: Not on file     Emotionally Abused: Not on file     Physically Abused: Not on file     Sexually Abused: Not on file     Physically or Sexually Abused: Not on file   Housing Stability: Low Risk  (12/7/2023)    Housing Stability Vital Sign     Unable to Pay for Housing in the Last Year: No     Number of Places Lived in the Last Year: 1     Unstable Housing in the Last Year: No       Review of Systems   Constitutional:         As noted in HPI otherwise negative       Physical Exam  Vitals and nursing note reviewed.   Constitutional:       General: She is not in acute distress.     Appearance: Normal appearance. She is well-developed. She is obese. She is not diaphoretic.   HENT:      Head: Normocephalic and atraumatic.      Right Ear: External ear normal.      Left Ear: External ear normal.   Eyes:      General:         Right eye: No discharge.         Left eye: No discharge.   Neck:      Trachea: No tracheal deviation.   Cardiovascular:      Rate and Rhythm: Normal rate and regular rhythm.      Heart sounds: Normal

## 2024-10-29 ENCOUNTER — OUTSIDE SERVICES (OUTPATIENT)
Dept: INTERNAL MEDICINE | Age: 64
End: 2024-10-29

## 2024-10-29 ENCOUNTER — TELEPHONE (OUTPATIENT)
Dept: PULMONOLOGY | Age: 64
End: 2024-10-29

## 2024-10-29 DIAGNOSIS — E66.01 MORBID OBESITY: ICD-10-CM

## 2024-10-29 DIAGNOSIS — G47.33 OSA (OBSTRUCTIVE SLEEP APNEA): ICD-10-CM

## 2024-10-29 DIAGNOSIS — I50.22 CHRONIC SYSTOLIC CONGESTIVE HEART FAILURE (HCC): ICD-10-CM

## 2024-10-29 DIAGNOSIS — J30.89 NON-SEASONAL ALLERGIC RHINITIS, UNSPECIFIED TRIGGER: ICD-10-CM

## 2024-10-29 DIAGNOSIS — M25.552 CHRONIC LEFT HIP PAIN: ICD-10-CM

## 2024-10-29 DIAGNOSIS — G89.29 CHRONIC PAIN OF RIGHT KNEE: ICD-10-CM

## 2024-10-29 DIAGNOSIS — M25.561 CHRONIC PAIN OF RIGHT KNEE: Primary | ICD-10-CM

## 2024-10-29 DIAGNOSIS — N32.81 OVERACTIVE BLADDER: ICD-10-CM

## 2024-10-29 DIAGNOSIS — R73.01 ELEVATED FASTING GLUCOSE: ICD-10-CM

## 2024-10-29 DIAGNOSIS — E78.2 MIXED HYPERLIPIDEMIA: ICD-10-CM

## 2024-10-29 DIAGNOSIS — I10 PRIMARY HYPERTENSION: ICD-10-CM

## 2024-10-29 DIAGNOSIS — I69.351 HEMIPARESIS AFFECTING RIGHT SIDE AS LATE EFFECT OF STROKE (HCC): ICD-10-CM

## 2024-10-29 DIAGNOSIS — Z86.73 HISTORY OF ISCHEMIC POSTERIOR CEREBRAL ARTERY STROKE: Primary | ICD-10-CM

## 2024-10-29 DIAGNOSIS — M25.561 CHRONIC PAIN OF RIGHT KNEE: ICD-10-CM

## 2024-10-29 DIAGNOSIS — Z86.73 HISTORY OF ISCHEMIC POSTERIOR CEREBRAL ARTERY STROKE: ICD-10-CM

## 2024-10-29 DIAGNOSIS — G89.29 CHRONIC LEFT HIP PAIN: ICD-10-CM

## 2024-10-29 DIAGNOSIS — M87.052 AVASCULAR NECROSIS OF BONE OF LEFT HIP: Primary | ICD-10-CM

## 2024-10-29 DIAGNOSIS — G89.29 CHRONIC PAIN OF RIGHT KNEE: Primary | ICD-10-CM

## 2024-10-29 PROBLEM — I63.9 LEFT PONTINE STROKE (HCC): Status: RESOLVED | Noted: 2023-05-24 | Resolved: 2024-10-29

## 2024-10-29 PROBLEM — I63.332 CEREBROVASCULAR ACCIDENT (CVA) DUE TO THROMBOSIS OF LEFT POSTERIOR CEREBRAL ARTERY (HCC): Status: RESOLVED | Noted: 2023-09-27 | Resolved: 2024-10-29

## 2024-10-29 NOTE — TELEPHONE ENCOUNTER
Maria Del Carmen called from [facility], pt is needing to have pulmonary clearance for hip replacement. Date of surgery not scheduled until pt has clearance from pulm and other physicians. Please advise on clearance.     Last ov: 09/10/2024  Next ov: 12/03/2024

## 2024-10-29 NOTE — PROGRESS NOTES
Thrush or hives      Ultram [Tramadol Hcl]      Pt unsure      Iv [Iodides] Rash     Pt states got hot spots with rash.    Lisinopril Rash    Losartan Rash    Tomato Rash    Z-Randall [Azithromycin] Anxiety       MEDICATIONS: As noted on the [facility], referenced and incorporated herein.    PAST MEDICAL HISTORY:   Past Medical History:   Diagnosis Date    Arrhythmia     Chronic pain     CVA (cerebral vascular accident) ([facility])     Edema     GERD (gastroesophageal reflux disease)     Hypertension     Stroke (cerebrum) ([facility]) 2019    Urticaria        PAST SURGICAL HISTORY:   Past Surgical History:   Procedure Laterality Date    CARPAL TUNNEL RELEASE Bilateral      SECTION      x 3    CHOLECYSTECTOMY      COLONOSCOPY      COLONOSCOPY  2018    [place]    CYSTOSCOPY N/A 2024    Cystoscopy Botox [id number] units performed by Anup Alford MD at [facility] OR    UPPER GASTROINTESTINAL ENDOSCOPY         SOCIAL HISTORY:     Tobacco:   Social History     Tobacco Use   Smoking Status Former    Current packs/day: 0.00    Average packs/day: 1 pack/day for 10.0 years (10.0 ttl pk-yrs)    Types: Cigarettes    Start date:     Quit date:     Years since quittin.8   Smokeless Tobacco Never     Alcohol:   Social History     Substance and Sexual Activity   Alcohol Use No    Alcohol/week: 0.0 standard drinks of alcohol     Drugs:   Social History     Substance and Sexual Activity   Drug Use No       FAMILY HISTORY: family history includes Arthritis in her sister; Cancer in her brother; High Blood Pressure in her mother; Other in her daughter.    REVIEW OF SYSTEMS:     Please see history of chief complaint above; otherwise no new problems with respect to General, HEENT, Cardiovascular, Respiratory, Gastrointestinal, Genitourinary, Endocrinologic, Musculoskeletal, or Neuropsychiatric complaints.       PHYSICAL EXAMINATION:    Vitals: Temp: 97.6 deg F. Pulse: 80. Resp: 18. BP: 132/74.  General: A 63 y.o.

## 2024-10-29 NOTE — PROGRESS NOTES
DR. ANDREWS - NURSING HOME VISIT    DATE OF SERVICE: 10/28/24    NURSING HOME: [facility]    CHIEF COMPLAINT/HISTORY OF CHIEF COMPLAINT: This patient is being seen for ongoing evaluation and management of her history of stroke with right hemiparesis, elevated fasting glucose, hypertension, hyperlipidemia, congestive heart failure, obstructive sleep apnea, overactive bladder, osteoarthritis with chronic right knee and left hip pain, and morbid obesity. She is also being seen for preoperative clearance for a left total hip replacement due to avascular necrosis of the hip to be done by Dr. Amor Don at [facility]. She just had a visit with his office earlier today, and they are requesting surgical clearance from Internal Medicine (us), Neurology, Pulmonology, and Cardiology. She is still trying to get to a point where she would be able to go home. She tries to control her elevated fasting glucose with diet and exercise. She takes Lipitor for hyperlipidemia. She sees Dr. Pino for her obstructive sleep apnea. She uses a CPAP machine for it. She sees Dr. Alford's group for her overactive bladder. She takes Gemtesa for breakthrough symptoms. She sees our cardiologists here for congestive heart failure. She gets Zyrtec for allergic rhinitis. There are no other complaints.      ALLERGIES:   Allergies   Allergen Reactions    Latex Rash    Codeine Anaphylaxis and Hives    Demerol Hcl [Meperidine] Anaphylaxis    Amoxicillin Other (See Comments)     Thrush or hives      Ciprofloxacin Other (See Comments)     Thrush or hives      Docusate Hives    Metoprolol Succinate [Metoprolol]      Pt unsure      Norvasc [Amlodipine] Hives    Omeprazole Other (See Comments)     \"Cramping\"    Prednisone Hives    Propofol      Low BP and pt states \"it took 24 hours to come out of anesthesia\"    Senna      Pt unsure      Sulfa Antibiotics Other (See Comments)     Thrush or hives      Ultram [Tramadol Hcl]      Pt unsure

## 2024-10-29 NOTE — PROGRESS NOTES
Endocrinologic, Musculoskeletal, or Neuropsychiatric complaints.       PHYSICAL EXAMINATION:    Vitals: Temp: 97.9 deg F. Pulse: 80. Resp: 18. BP: 133/68.  General: A 63 y.o.  female. Alert and oriented to person, place, and time. She does not appear to be in any acute distress.  Skin: Skin color, texture, turgor normal. No rashes or lesions.  HEENT/Neck: Essentially unremarkable  Lungs: Normal - CTA without rales, rhonchi, or wheezing.  Heart: regular rate and rhythm, S1, S2 normal, no murmur, click, rub or gallop No S3 or S4.  Abdomen: Obese soft, non-distended, non-tender, normal active bowel sounds, no masses palpated, and no hepatosplenomegaly  Extremities: No clubbing, cyanosis, or edema in any of the extremities.  Neurologic: cranial nerves II-XII are grossly intact      ASSESSMENT/PLAN:    1. History of ischemic posterior cerebral artery stroke  2. Hemiparesis affecting right side as late effect of stroke (HCC), stable  - She will continue to get physical therapy    3. Chronic pain of right knee, stable  4. Chronic left hip pain, worse  - She will continue to see orthopedics at [facility]  - We reviewed their most recent visit note  - She is now working towards a left hip replacement  - She will continue to get the physical therapy for her right knee to strengthen it for when she has the hip surgery.    5. Elevated fasting glucose, stable  - She will continue to watch her diet and exercise to keep her elevated fasting glucose under control.   - We will continue to monitor for the development of diabetes.     6. Primary hypertension, controlled  - She will continue to take her antihypertensive medication     7. Mixed hyperlipidemia, controlled  - She will continue to take Lipitor    8. Chronic systolic congestive heart failure (HCC), stable  - She will continue to see our cardiologists here    9. TROY (obstructive sleep apnea), stable  - She will see Dr. Pino's group as

## 2024-10-29 NOTE — PROGRESS NOTES
DR. ANDREWS - NURSING HOME VISIT    DATE OF SERVICE: 9/29/24    NURSING HOME: [facility]    CHIEF COMPLAINT/HISTORY OF CHIEF COMPLAINT: This patient is being seen for ongoing evaluation and management of her history of stroke with right hemiparesis, elevated fasting glucose, hypertension, hyperlipidemia, congestive heart failure, obstructive sleep apnea, overactive bladder, osteoarthritis with chronic right knee and left hip pain, and morbid obesity. She is still trying to get to a point where she would be able to go home. She tries to control her elevated fasting glucose with diet and exercise. She takes Lipitor for hyperlipidemia. She recently saw Dr. Pino on 9/10/24 for her obstructive sleep apnea. He prescribed CPAP therapy. She sees Dr. Alford's group for her overactive bladder. Her last visit with them was on 9/5/24. They started her on Gemtesa for breakthrough symptoms. She sees our cardiologists here for congestive heart failure. She gets Zyrtec for allergic rhinitis. She sees orthopedics at [facility] for her right knee pain and left hip pain. She is still working towards getting a left hip replacement done. There are no new complaints at this time.      ALLERGIES:   Allergies   Allergen Reactions    Latex Rash    Codeine Anaphylaxis and Hives    Demerol Hcl [Meperidine] Anaphylaxis    Amoxicillin Other (See Comments)     Thrush or hives      Ciprofloxacin Other (See Comments)     Thrush or hives      Docusate Hives    Metoprolol Succinate [Metoprolol]      Pt unsure      Norvasc [Amlodipine] Hives    Omeprazole Other (See Comments)     \"Cramping\"    Prednisone Hives    Propofol      Low BP and pt states \"it took 24 hours to come out of anesthesia\"    Senna      Pt unsure      Sulfa Antibiotics Other (See Comments)     Thrush or hives      Ultram [Tramadol Hcl]      Pt unsure      Iv [Iodides] Rash     Pt states got hot spots with rash.    Lisinopril Rash

## 2024-10-29 NOTE — PROGRESS NOTES
hives      Ultram [Tramadol Hcl]      Pt unsure      Iv [Iodides] Rash     Pt states got hot spots with rash.    Lisinopril Rash    Losartan Rash    Tomato Rash    Z-Randall [Azithromycin] Anxiety       MEDICATIONS: As noted on the [facility], referenced and incorporated herein.    PAST MEDICAL HISTORY:   Past Medical History:   Diagnosis Date    Arrhythmia     Cerebrovascular accident (CVA) due to thrombosis of left posterior cerebral artery ([facility]) 2023    Chronic pain     CVA (cerebral vascular accident) ([facility])     Edema     GERD (gastroesophageal reflux disease)     Hypertension     Left pontine stroke ([facility]) 2023    Stroke (cerebrum) ([facility]) 2019    Urticaria        PAST SURGICAL HISTORY:   Past Surgical History:   Procedure Laterality Date    CARPAL TUNNEL RELEASE Bilateral      SECTION      x 3    CHOLECYSTECTOMY      COLONOSCOPY      COLONOSCOPY  2018    [place]    CYSTOSCOPY N/A 2024    Cystoscopy Botox [id number] units performed by Anup Alford MD at [facility] OR    UPPER GASTROINTESTINAL ENDOSCOPY         SOCIAL HISTORY:     Tobacco:   Social History     Tobacco Use   Smoking Status Former    Current packs/day: 0.00    Average packs/day: 1 pack/day for 10.0 years (10.0 ttl pk-yrs)    Types: Cigarettes    Start date:     Quit date:     Years since quittin.8   Smokeless Tobacco Never     Alcohol:   Social History     Substance and Sexual Activity   Alcohol Use No    Alcohol/week: 0.0 standard drinks of alcohol     Drugs:   Social History     Substance and Sexual Activity   Drug Use No       FAMILY HISTORY: family history includes Arthritis in her sister; Cancer in her brother; High Blood Pressure in her mother; Other in her daughter.    REVIEW OF SYSTEMS:     Please see history of chief complaint above; otherwise no new problems with respect to General, HEENT, Cardiovascular, Respiratory, Gastrointestinal, Genitourinary, Endocrinologic, Musculoskeletal, or

## 2024-10-30 ENCOUNTER — HOSPITAL ENCOUNTER (OUTPATIENT)
Age: 64
Setting detail: SPECIMEN
Discharge: HOME | End: 2024-10-30

## 2024-10-30 LAB
25(OH)D3 SERPL-MCNC: 45.1 NG/ML (ref 30–100)
ANION GAP SERPL CALCULATED.3IONS-SCNC: 12 MMOL/L (ref 9–17)
BUN SERPL-MCNC: 23 MG/DL (ref 8–23)
BUN/CREAT SERPL: 29 (ref 9–20)
CALCIUM SERPL-MCNC: 9.8 MG/DL (ref 8.6–10.4)
CHLORIDE SERPL-SCNC: 95 MMOL/L (ref 98–107)
CO2 SERPL-SCNC: 30 MMOL/L (ref 20–31)
CREAT SERPL-MCNC: 0.8 MG/DL (ref 0.5–0.9)
CRP SERPL HS-MCNC: 9.4 MG/L (ref 0–5)
ERYTHROCYTE [DISTWIDTH] IN BLOOD BY AUTOMATED COUNT: 14.3 % (ref 11.8–14.4)
GFR, ESTIMATED: 82 ML/MIN/1.73M2
GLUCOSE SERPL-MCNC: 106 MG/DL (ref 70–99)
HCT VFR BLD AUTO: 35.6 % (ref 36.3–47.1)
HGB BLD-MCNC: 12 G/DL (ref 11.9–15.1)
MCH RBC QN AUTO: 28.9 PG (ref 25.2–33.5)
MCHC RBC AUTO-ENTMCNC: 33.7 G/DL (ref 25.2–33.5)
MCV RBC AUTO: 85.8 FL (ref 82.6–102.9)
NRBC BLD-RTO: 0 PER 100 WBC
PLATELET # BLD AUTO: 248 K/UL (ref 138–453)
PMV BLD AUTO: 9.4 FL (ref 8.1–13.5)
POTASSIUM SERPL-SCNC: 3.4 MMOL/L (ref 3.7–5.3)
PTH-INTACT SERPL-MCNC: 14 PG/ML (ref 15–65)
RBC # BLD AUTO: 4.15 M/UL (ref 3.95–5.11)
SODIUM SERPL-SCNC: 137 MMOL/L (ref 135–144)
WBC OTHER # BLD: 5.6 K/UL (ref 3.5–11.3)

## 2024-10-30 PROCEDURE — 83970 ASSAY OF PARATHORMONE: CPT

## 2024-10-30 PROCEDURE — 85027 COMPLETE CBC AUTOMATED: CPT

## 2024-10-30 PROCEDURE — 86140 C-REACTIVE PROTEIN: CPT

## 2024-10-30 PROCEDURE — 80048 BASIC METABOLIC PNL TOTAL CA: CPT

## 2024-10-30 PROCEDURE — 82306 VITAMIN D 25 HYDROXY: CPT

## 2024-10-30 PROCEDURE — 36415 COLL VENOUS BLD VENIPUNCTURE: CPT

## 2024-10-31 ENCOUNTER — OUTSIDE SERVICES (OUTPATIENT)
Dept: INTERNAL MEDICINE | Age: 64
End: 2024-10-31

## 2024-10-31 DIAGNOSIS — L30.9 DERMATITIS: Primary | ICD-10-CM

## 2024-11-01 ENCOUNTER — OFFICE VISIT (OUTPATIENT)
Dept: CARDIOLOGY | Age: 64
End: 2024-11-01
Payer: MEDICARE

## 2024-11-01 VITALS
DIASTOLIC BLOOD PRESSURE: 68 MMHG | WEIGHT: 282 LBS | HEART RATE: 63 BPM | HEIGHT: 64 IN | BODY MASS INDEX: 48.14 KG/M2 | SYSTOLIC BLOOD PRESSURE: 128 MMHG

## 2024-11-01 DIAGNOSIS — Z87.891 EX-SMOKER: ICD-10-CM

## 2024-11-01 DIAGNOSIS — Z86.73 HISTORY OF ISCHEMIC POSTERIOR CEREBRAL ARTERY STROKE: ICD-10-CM

## 2024-11-01 DIAGNOSIS — G47.33 OSA (OBSTRUCTIVE SLEEP APNEA): ICD-10-CM

## 2024-11-01 DIAGNOSIS — E78.2 MIXED HYPERLIPIDEMIA: ICD-10-CM

## 2024-11-01 DIAGNOSIS — I10 HYPERTENSION, ESSENTIAL: Primary | ICD-10-CM

## 2024-11-01 DIAGNOSIS — I10 PRIMARY HYPERTENSION: ICD-10-CM

## 2024-11-01 PROCEDURE — G8417 CALC BMI ABV UP PARAM F/U: HCPCS | Performed by: INTERNAL MEDICINE

## 2024-11-01 PROCEDURE — 1090F PRES/ABSN URINE INCON ASSESS: CPT | Performed by: INTERNAL MEDICINE

## 2024-11-01 PROCEDURE — G8427 DOCREV CUR MEDS BY ELIG CLIN: HCPCS | Performed by: INTERNAL MEDICINE

## 2024-11-01 PROCEDURE — 3078F DIAST BP <80 MM HG: CPT | Performed by: INTERNAL MEDICINE

## 2024-11-01 PROCEDURE — 3017F COLORECTAL CA SCREEN DOC REV: CPT | Performed by: INTERNAL MEDICINE

## 2024-11-01 PROCEDURE — G8400 PT W/DXA NO RESULTS DOC: HCPCS | Performed by: INTERNAL MEDICINE

## 2024-11-01 PROCEDURE — 3074F SYST BP LT 130 MM HG: CPT | Performed by: INTERNAL MEDICINE

## 2024-11-01 PROCEDURE — 99213 OFFICE O/P EST LOW 20 MIN: CPT | Performed by: INTERNAL MEDICINE

## 2024-11-01 PROCEDURE — 1036F TOBACCO NON-USER: CPT | Performed by: INTERNAL MEDICINE

## 2024-11-01 PROCEDURE — 1123F ACP DISCUSS/DSCN MKR DOCD: CPT | Performed by: INTERNAL MEDICINE

## 2024-11-01 PROCEDURE — 99214 OFFICE O/P EST MOD 30 MIN: CPT | Performed by: INTERNAL MEDICINE

## 2024-11-01 PROCEDURE — 93010 ELECTROCARDIOGRAM REPORT: CPT | Performed by: INTERNAL MEDICINE

## 2024-11-01 PROCEDURE — G8484 FLU IMMUNIZE NO ADMIN: HCPCS | Performed by: INTERNAL MEDICINE

## 2024-11-01 PROCEDURE — 93005 ELECTROCARDIOGRAM TRACING: CPT | Performed by: INTERNAL MEDICINE

## 2024-11-01 NOTE — PROGRESS NOTES
[facility]  [facility] A [facility]PARTMENT OF [facility]  [address] [facility] [address]  Dept: [phone]      CC; HTN , CVA , Pre-op   Subjective:  The patient is a 64 y.o. year old, , female is in the office for a follow up visit.    Patient is from nursing home on wheelchair    According to her right side is paralyzed unable to walk up weight on it  Patient is here for preop but according to her she does not know what kind of procedure she is going     She denies any chest pain or discomfort. No orthopnea or PND. Denies any palpitation, dizziness or syncope. She is completely asymptomatic from cardiac stand point.    She does not want to take lots of medications, she is on limited medications for cardiac medications but she is on multiple other medications like multivitamin, melatonin, constipation medications and allergy medications.  She is not on any lipid medications given his significant history of 2 times stroke, she said that she had a muscle cramp when she took lipid medication last time so I will start her on low-dose rosuvastatin 5 mg daily because it is very important for her and outpatient follow-up with lipid profile, liver function test and CPK after 1 week of rosuvastatin.    Past Medical History:   has a past medical history of Arrhythmia, Cerebrovascular accident (CVA) due to thrombosis of left posterior cerebral artery (HCC), Chronic pain, CVA (cerebral vascular accident) (HCC), Edema, GERD (gastroesophageal reflux disease), Hypertension, Left pontine stroke (HCC), Stroke (cerebrum) (HCC), and Urticaria.    Past Surgical History:   has a past surgical history that includes Colonoscopy; Carpal tunnel release (Bilateral);  section; Upper gastrointestinal endoscopy; Cholecystectomy; Colonoscopy (2018); and Cystoscopy (N/A, 2024).    Home Medications:  Prior to Admission medications

## 2024-11-06 ENCOUNTER — OFFICE VISIT (OUTPATIENT)
Dept: UROLOGY | Age: 64
End: 2024-11-06
Payer: MEDICARE

## 2024-11-06 VITALS
RESPIRATION RATE: 14 BRPM | OXYGEN SATURATION: 98 % | DIASTOLIC BLOOD PRESSURE: 60 MMHG | HEIGHT: 64 IN | BODY MASS INDEX: 47.8 KG/M2 | SYSTOLIC BLOOD PRESSURE: 130 MMHG | WEIGHT: 280 LBS | HEART RATE: 76 BPM

## 2024-11-06 DIAGNOSIS — N39.41 URGE INCONTINENCE: ICD-10-CM

## 2024-11-06 DIAGNOSIS — R35.0 URINARY FREQUENCY: ICD-10-CM

## 2024-11-06 DIAGNOSIS — N39.0 RECURRENT UTI: ICD-10-CM

## 2024-11-06 DIAGNOSIS — N32.81 OVERACTIVE BLADDER: Primary | ICD-10-CM

## 2024-11-06 DIAGNOSIS — R35.1 NOCTURIA: ICD-10-CM

## 2024-11-06 PROCEDURE — 99214 OFFICE O/P EST MOD 30 MIN: CPT | Performed by: UROLOGY

## 2024-11-06 RX ORDER — TRIMETHOPRIM 100 MG/1
100 TABLET ORAL DAILY
Qty: 90 TABLET | Refills: 0 | Status: SHIPPED | OUTPATIENT
Start: 2024-11-06 | End: 2025-02-04

## 2024-11-06 NOTE — PROGRESS NOTES
[facility]  [address] [facility] [address]  Dept: [phone]  Visit Date: 11/6/2024      HPI:   Vane Hickey is a 64 y.o. female with past medical history as listed below who presents today in follow-up for OAB follow-up.      S/P Cystoscopy, Bladder Botox Injections (100 U) by Dr. Alford on 7/31/24. Patient has done well following her procedure. Urinary urgency, UUI, and frequency are improved but not perfect. Still with nocturia and UUI at night sometimes.    Cranberry + Probiotic for recurrent UTI prevention.    Stroke and Parkinson's Dz history.    Labs   PVR: 99 mL    Current Outpatient Medications   Medication Sig Dispense Refill    trimethoprim (TRIMPEX) 100 MG tablet Take 1 tablet by mouth daily 90 tablet 0    cetirizine (ZYRTEC) 5 MG tablet Take 1 tablet by mouth daily      fesoterodine (TOVIAZ) 4 MG TB24 ER tablet Take 1 tablet by mouth daily      vibegron (GEMTESA) 75 MG TABS tablet Take 1 tablet by mouth daily 30 tablet 5    ELDERBERRY PO Take 2 tablets by mouth daily 2 gummies      ferrous sulfate (FE TABS 325) 325 (65 Fe) MG EC tablet Take 1 tablet by mouth daily      Lactobacillus (PROBIOTIC ACIDOPHILUS PO) Take 1 tablet by mouth daily      carvedilol (COREG) 12.5 MG tablet Take 2 tablets by mouth 2 times daily Take 25 in am and 12.5 pm (Patient taking differently: Take 1 tablet by mouth daily Taking 25mg in the morning and 12.5mg at night) 60 tablet 3    polyethylene glycol (GLYCOLAX) 17 GM/SCOOP powder Take 17 g by mouth daily      dextromethorphan (DELSYM) 30 MG/5ML extended release liquid Take 10 mLs by mouth 2 times daily as needed for Cough      hydrALAZINE (APRESOLINE) 50 MG tablet Take 1 tablet by mouth 3 times daily (Patient taking differently: Take 0.5 tablets by mouth 3 times daily) 90 tablet 3    diclofenac sodium (VOLTAREN) 1 % GEL Apply 2 g topically 4

## 2024-11-07 ENCOUNTER — OFFICE VISIT (OUTPATIENT)
Dept: NEUROLOGY | Age: 64
End: 2024-11-07
Payer: MEDICARE

## 2024-11-07 VITALS
WEIGHT: 280 LBS | DIASTOLIC BLOOD PRESSURE: 72 MMHG | RESPIRATION RATE: 16 BRPM | OXYGEN SATURATION: 94 % | SYSTOLIC BLOOD PRESSURE: 136 MMHG | HEART RATE: 76 BPM | BODY MASS INDEX: 48.06 KG/M2

## 2024-11-07 DIAGNOSIS — I63.9 LEFT PONTINE STROKE (HCC): ICD-10-CM

## 2024-11-07 DIAGNOSIS — Z91.89 AT HIGH RISK FOR STROKE: ICD-10-CM

## 2024-11-07 DIAGNOSIS — F41.9 ANXIETY AND DEPRESSION: ICD-10-CM

## 2024-11-07 DIAGNOSIS — R73.01 ELEVATED FASTING GLUCOSE: ICD-10-CM

## 2024-11-07 DIAGNOSIS — Z80.3 FAMILY HISTORY OF BREAST CANCER: ICD-10-CM

## 2024-11-07 DIAGNOSIS — G47.9 SLEEP DIFFICULTIES: ICD-10-CM

## 2024-11-07 DIAGNOSIS — E78.2 MIXED HYPERLIPIDEMIA: ICD-10-CM

## 2024-11-07 DIAGNOSIS — I67.82 CHRONIC CEREBRAL ISCHEMIA: ICD-10-CM

## 2024-11-07 DIAGNOSIS — F32.A ANXIETY AND DEPRESSION: ICD-10-CM

## 2024-11-07 DIAGNOSIS — G47.33 OSA (OBSTRUCTIVE SLEEP APNEA): ICD-10-CM

## 2024-11-07 DIAGNOSIS — R20.0 NUMBNESS AND TINGLING OF RIGHT UPPER AND LOWER EXTREMITY: ICD-10-CM

## 2024-11-07 DIAGNOSIS — I10 PRIMARY HYPERTENSION: ICD-10-CM

## 2024-11-07 DIAGNOSIS — I69.30 HISTORY OF CEREBROVASCULAR ACCIDENT (CVA) WITH RESIDUAL DEFICIT: Primary | ICD-10-CM

## 2024-11-07 DIAGNOSIS — Z87.891 EX-SMOKER: ICD-10-CM

## 2024-11-07 DIAGNOSIS — G81.91 RIGHT HEMIPLEGIA (HCC): ICD-10-CM

## 2024-11-07 DIAGNOSIS — E66.01 SEVERE OBESITY (BMI >= 40): ICD-10-CM

## 2024-11-07 DIAGNOSIS — Z86.73 H/O: STROKE: ICD-10-CM

## 2024-11-07 DIAGNOSIS — R20.2 NUMBNESS AND TINGLING OF RIGHT UPPER AND LOWER EXTREMITY: ICD-10-CM

## 2024-11-07 PROCEDURE — 1090F PRES/ABSN URINE INCON ASSESS: CPT | Performed by: PSYCHIATRY & NEUROLOGY

## 2024-11-07 PROCEDURE — G8427 DOCREV CUR MEDS BY ELIG CLIN: HCPCS | Performed by: PSYCHIATRY & NEUROLOGY

## 2024-11-07 PROCEDURE — 99214 OFFICE O/P EST MOD 30 MIN: CPT | Performed by: PSYCHIATRY & NEUROLOGY

## 2024-11-07 PROCEDURE — 3078F DIAST BP <80 MM HG: CPT | Performed by: PSYCHIATRY & NEUROLOGY

## 2024-11-07 PROCEDURE — G8400 PT W/DXA NO RESULTS DOC: HCPCS | Performed by: PSYCHIATRY & NEUROLOGY

## 2024-11-07 PROCEDURE — 99215 OFFICE O/P EST HI 40 MIN: CPT | Performed by: PSYCHIATRY & NEUROLOGY

## 2024-11-07 PROCEDURE — 3017F COLORECTAL CA SCREEN DOC REV: CPT | Performed by: PSYCHIATRY & NEUROLOGY

## 2024-11-07 PROCEDURE — 1123F ACP DISCUSS/DSCN MKR DOCD: CPT | Performed by: PSYCHIATRY & NEUROLOGY

## 2024-11-07 PROCEDURE — 1036F TOBACCO NON-USER: CPT | Performed by: PSYCHIATRY & NEUROLOGY

## 2024-11-07 PROCEDURE — 3075F SYST BP GE 130 - 139MM HG: CPT | Performed by: PSYCHIATRY & NEUROLOGY

## 2024-11-07 PROCEDURE — G8484 FLU IMMUNIZE NO ADMIN: HCPCS | Performed by: PSYCHIATRY & NEUROLOGY

## 2024-11-07 PROCEDURE — G8417 CALC BMI ABV UP PARAM F/U: HCPCS | Performed by: PSYCHIATRY & NEUROLOGY

## 2024-11-07 RX ORDER — ROSUVASTATIN CALCIUM 5 MG/1
TABLET, COATED ORAL
COMMUNITY
Start: 2024-11-01

## 2024-11-07 ASSESSMENT — PATIENT HEALTH QUESTIONNAIRE - PHQ9
SUM OF ALL RESPONSES TO PHQ QUESTIONS 1-9: 0
2. FEELING DOWN, DEPRESSED OR HOPELESS: NOT AT ALL
1. LITTLE INTEREST OR PLEASURE IN DOING THINGS: NOT AT ALL
SUM OF ALL RESPONSES TO PHQ QUESTIONS 1-9: 0
SUM OF ALL RESPONSES TO PHQ9 QUESTIONS 1 & 2: 0

## 2024-11-07 ASSESSMENT — ENCOUNTER SYMPTOMS
SINUS PRESSURE: 0
CHEST TIGHTNESS: 0
CHOKING: 0
CONSTIPATION: 0
EYE ITCHING: 0
EYE DISCHARGE: 0
PHOTOPHOBIA: 0
DIARRHEA: 0
WHEEZING: 0
COLOR CHANGE: 0
BLOOD IN STOOL: 0
FACIAL SWELLING: 0
BACK PAIN: 1
APNEA: 0
EYE PAIN: 0
ABDOMINAL DISTENTION: 0
VOICE CHANGE: 0
EYE REDNESS: 0
TROUBLE SWALLOWING: 0
SHORTNESS OF BREATH: 0

## 2024-11-07 NOTE — PROGRESS NOTES
HISTORY,SURGICAL   HISTORY,     MEDICATIONS   LIST,   ALLERGIES AND  DRUG  INTOLERANCES,       FAMILY   HISTORY,  SOCIAL  HISTORY,      PROBLEM  LIST   FOR  PATIENT  CARE   COORDINATION          Past Medical History:   Diagnosis Date    Arrhythmia     Cerebrovascular accident (CVA) due to thrombosis of left posterior cerebral artery (HCC) 2023    Chronic pain     CVA (cerebral vascular accident) (formerly [facility])     Edema     GERD (gastroesophageal reflux disease)     Hypertension     Left pontine stroke (HCC) 2023    Stroke (cerebrum) (formerly [facility]) 2019    Urticaria          Past Surgical History:   Procedure Laterality Date    CARPAL TUNNEL RELEASE Bilateral      SECTION      x 3    CHOLECYSTECTOMY      COLONOSCOPY      COLONOSCOPY  2018    [facility]    CYSTOSCOPY N/A 2024    Cystoscopy Botox [id number] units performed by Anup Alford MD at [facility] OR    UPPER GASTROINTESTINAL ENDOSCOPY           Current Outpatient Medications   Medication Sig Dispense Refill    rosuvastatin (CRESTOR) 5 MG tablet       trimethoprim (TRIMPEX) 100 MG tablet Take 1 tablet by mouth daily 90 tablet 0    cetirizine (ZYRTEC) 5 MG tablet Take 1 tablet by mouth daily      fesoterodine (TOVIAZ) 4 MG TB24 ER tablet Take 1 tablet by mouth daily      vibegron (GEMTESA) 75 MG TABS tablet Take 1 tablet by mouth daily 30 tablet 5    ELDERBERRY PO Take 2 tablets by mouth daily 2 gummies      ferrous sulfate (FE TABS 325) 325 (65 Fe) MG EC tablet Take 1 tablet by mouth daily      Lactobacillus (PROBIOTIC ACIDOPHILUS PO) Take 1 tablet by mouth daily      carvedilol (COREG) 12.5 MG tablet Take 2 tablets by mouth 2 times daily Take 25 in am and 12.5 pm (Patient taking differently: Take 1 tablet by mouth daily Taking 25mg in the morning and 12.5mg at night) 60 tablet 3    polyethylene glycol (GLYCOLAX) 17 GM/SCOOP powder Take 17 g by mouth daily      dextromethorphan (DELSYM) 30 MG/5ML extended release liquid Take 10 mLs by mouth 2 times

## 2024-11-08 ENCOUNTER — HOSPITAL ENCOUNTER (OUTPATIENT)
Age: 64
Setting detail: SPECIMEN
Discharge: HOME | End: 2024-11-08
Payer: MEDICARE

## 2024-11-08 DIAGNOSIS — R20.2 NUMBNESS AND TINGLING OF RIGHT UPPER AND LOWER EXTREMITY: ICD-10-CM

## 2024-11-08 DIAGNOSIS — F32.A ANXIETY AND DEPRESSION: ICD-10-CM

## 2024-11-08 DIAGNOSIS — E66.01 SEVERE OBESITY (BMI >= 40): ICD-10-CM

## 2024-11-08 DIAGNOSIS — F41.9 ANXIETY AND DEPRESSION: ICD-10-CM

## 2024-11-08 DIAGNOSIS — I63.9 LEFT PONTINE STROKE (HCC): ICD-10-CM

## 2024-11-08 DIAGNOSIS — G81.91 RIGHT HEMIPLEGIA (HCC): ICD-10-CM

## 2024-11-08 DIAGNOSIS — R73.01 ELEVATED FASTING GLUCOSE: ICD-10-CM

## 2024-11-08 DIAGNOSIS — Z80.3 FAMILY HISTORY OF BREAST CANCER: ICD-10-CM

## 2024-11-08 DIAGNOSIS — G47.33 OSA (OBSTRUCTIVE SLEEP APNEA): ICD-10-CM

## 2024-11-08 DIAGNOSIS — G47.9 SLEEP DIFFICULTIES: ICD-10-CM

## 2024-11-08 DIAGNOSIS — I69.30 HISTORY OF CEREBROVASCULAR ACCIDENT (CVA) WITH RESIDUAL DEFICIT: ICD-10-CM

## 2024-11-08 DIAGNOSIS — I67.82 CHRONIC CEREBRAL ISCHEMIA: ICD-10-CM

## 2024-11-08 DIAGNOSIS — R20.0 NUMBNESS AND TINGLING OF RIGHT UPPER AND LOWER EXTREMITY: ICD-10-CM

## 2024-11-08 DIAGNOSIS — I10 PRIMARY HYPERTENSION: ICD-10-CM

## 2024-11-08 DIAGNOSIS — Z91.89 AT HIGH RISK FOR STROKE: ICD-10-CM

## 2024-11-08 DIAGNOSIS — Z87.891 EX-SMOKER: ICD-10-CM

## 2024-11-08 DIAGNOSIS — Z86.73 H/O: STROKE: ICD-10-CM

## 2024-11-08 DIAGNOSIS — E78.2 MIXED HYPERLIPIDEMIA: ICD-10-CM

## 2024-11-08 LAB
FOLATE SERPL-MCNC: 34.3 NG/ML (ref 4.8–24.2)
RHEUMATOID FACT SER NEPH-ACNC: 41 IU/ML (ref 0–13)
TSH SERPL DL<=0.05 MIU/L-ACNC: 3.34 UIU/ML (ref 0.3–5)
VIT B12 SERPL-MCNC: 1105 PG/ML (ref 232–1245)

## 2024-11-08 PROCEDURE — 86147 CARDIOLIPIN ANTIBODY EA IG: CPT

## 2024-11-08 PROCEDURE — 86038 ANTINUCLEAR ANTIBODIES: CPT

## 2024-11-08 PROCEDURE — 82607 VITAMIN B-12: CPT

## 2024-11-08 PROCEDURE — 85610 PROTHROMBIN TIME: CPT

## 2024-11-08 PROCEDURE — 86225 DNA ANTIBODY NATIVE: CPT

## 2024-11-08 PROCEDURE — 86431 RHEUMATOID FACTOR QUANT: CPT

## 2024-11-08 PROCEDURE — 82746 ASSAY OF FOLIC ACID SERUM: CPT

## 2024-11-08 PROCEDURE — 36415 COLL VENOUS BLD VENIPUNCTURE: CPT

## 2024-11-08 PROCEDURE — 84443 ASSAY THYROID STIM HORMONE: CPT

## 2024-11-08 PROCEDURE — 85613 RUSSELL VIPER VENOM DILUTED: CPT

## 2024-11-08 PROCEDURE — 85730 THROMBOPLASTIN TIME PARTIAL: CPT

## 2024-11-10 LAB
ANA SER QL IA: NEGATIVE
CARDIOLIPIN IGA SER IA-ACNC: 1.3 APL (ref 0–14)
CARDIOLIPIN IGG SER IA-ACNC: 1.3 GPL (ref 0–10)
CARDIOLIPIN IGM SER IA-ACNC: 2.9 MPL (ref 0–10)
DILUTE RUSSELL VIPER VENOM TIME: NORMAL
DSDNA IGG SER QL IA: 2.4 IU/ML
INR PPP: 1
LUPUS ANTICOAG: NORMAL
NUCLEAR IGG SER IA-RTO: 0.3 U/ML
PARTIAL THROMBOPLASTIN TIME: 32.8 SEC (ref 23–36.5)
PROTHROMBIN TIME: 12.7 SEC (ref 11.7–14.9)

## 2024-11-11 ENCOUNTER — HOSPITAL ENCOUNTER (OUTPATIENT)
Age: 64
Setting detail: SPECIMEN
Discharge: HOME | End: 2024-11-11
Payer: MEDICARE

## 2024-11-11 LAB
ALBUMIN SERPL-MCNC: 3.7 G/DL (ref 3.5–5.2)
ALBUMIN/GLOB SERPL: 1.3 {RATIO} (ref 1–2.5)
ALP SERPL-CCNC: 68 U/L (ref 35–104)
ALT SERPL-CCNC: 14 U/L (ref 5–33)
AST SERPL-CCNC: 14 U/L
BILIRUB DIRECT SERPL-MCNC: 0.1 MG/DL
BILIRUB INDIRECT SERPL-MCNC: 0.3 MG/DL (ref 0–1)
BILIRUB SERPL-MCNC: 0.4 MG/DL (ref 0.3–1.2)
CARDIOLIPIN IGA SER IA-ACNC: 1.3 APL (ref 0–14)
CARDIOLIPIN IGG SER IA-ACNC: 1.3 GPL (ref 0–10)
CARDIOLIPIN IGM SER IA-ACNC: 2.9 MPL (ref 0–10)
CHOLEST SERPL-MCNC: 105 MG/DL (ref 0–199)
CHOLESTEROL/HDL RATIO: 2.5
CK SERPL-CCNC: 42 U/L (ref 26–192)
DILUTE RUSSELL VIPER VENOM TIME: NORMAL
GLOBULIN SER CALC-MCNC: 2.8 G/DL (ref 1.5–3.8)
HDLC SERPL-MCNC: 42 MG/DL
INR PPP: 1
LDLC SERPL CALC-MCNC: 46 MG/DL (ref 0–100)
PARTIAL THROMBOPLASTIN TIME: 32.8 SEC (ref 23–36.5)
PROT SERPL-MCNC: 6.5 G/DL (ref 6.4–8.3)
PROTHROMBIN TIME: 12.7 SEC (ref 11.7–14.9)
TRIGL SERPL-MCNC: 85 MG/DL
VLDLC SERPL CALC-MCNC: 17 MG/DL (ref 1–30)

## 2024-11-11 PROCEDURE — 82550 ASSAY OF CK (CPK): CPT

## 2024-11-11 PROCEDURE — 36415 COLL VENOUS BLD VENIPUNCTURE: CPT

## 2024-11-11 PROCEDURE — 80076 HEPATIC FUNCTION PANEL: CPT

## 2024-11-11 PROCEDURE — 80061 LIPID PANEL: CPT

## 2024-11-13 ENCOUNTER — OUTSIDE SERVICES (OUTPATIENT)
Dept: INTERNAL MEDICINE | Age: 64
End: 2024-11-13

## 2024-11-13 DIAGNOSIS — M05.80 POLYARTHRITIS WITH POSITIVE RHEUMATOID FACTOR (HCC): ICD-10-CM

## 2024-11-13 DIAGNOSIS — R05.2 SUBACUTE COUGH: ICD-10-CM

## 2024-11-13 DIAGNOSIS — M25.551 RIGHT HIP PAIN: Primary | ICD-10-CM

## 2024-11-13 DIAGNOSIS — I10 PRIMARY HYPERTENSION: ICD-10-CM

## 2024-11-13 RX ORDER — TRAMADOL HYDROCHLORIDE 50 MG/1
50 TABLET ORAL NIGHTLY
Qty: 30 TABLET | Refills: 0 | Status: SHIPPED | OUTPATIENT
Start: 2024-11-13 | End: 2024-12-13

## 2024-11-13 NOTE — PROGRESS NOTES
11/13/24  Vane Hickey  1960    Patient Resident of [facility]    Chief Complaint  1. Right hip pain    2. Polyarthritis with positive rheumatoid factor (HCC)    3. Subacute cough    4. Primary hypertension        HPI:  64-year-old patient with right hip OA with polyarthritis.  She is using Tylenol and ibuprofen with persistent pain through the evening.  States not able to sleep well.  Also has Zanaflex capsules that she has been using.  She is getting set up for surgery.  Asking something stronger for pain until she has the surgery.  Blood pressure mildly elevated.  Feels that it is related to pain.  She also is asking for something for a cough.  States she has had it for a couple weeks.  Does keep her awake.  Nonproductive.  Has been afebrile.  Slowly improving      Allergies   Allergen Reactions    Latex Rash    Codeine Anaphylaxis and Hives    Demerol Hcl [Meperidine] Anaphylaxis    Propofol Other (See Comments)     Low BP and pt states \"it took 24 hours to come out of anesthesia\"    Amoxicillin Other (See Comments)     Thrush or hives      Sulfa Antibiotics Hives, Rash and Other (See Comments)     Thrush or hives      Ciprofloxacin Other (See Comments)     Thrush or hives      Docusate Hives    Iv [Iodides] Rash     Pt states got hot spots with rash.    Lipitor [Atorvastatin] Other (See Comments)    Lisinopril Rash    Losartan Rash    Metoprolol Succinate [Metoprolol] Other (See Comments)     Pt unsure      Norvasc [Amlodipine] Hives    Omeprazole Other (See Comments)     \"Cramping\"    Prednisone Hives    Senna Other (See Comments)     Pt unsure      Sulfasalazine Other (See Comments)    Tomato Rash    Ultram [Tramadol Hcl] Other (See Comments)     Pt unsure      Z-Randall [Azithromycin] Anxiety       Past Medical History:   Diagnosis Date    Arrhythmia     Cerebrovascular accident (CVA) due to thrombosis of left posterior cerebral artery (HCC) 09/27/2023    Chronic pain     CVA (cerebral vascular

## 2024-11-14 ENCOUNTER — HOSPITAL ENCOUNTER (OUTPATIENT)
Dept: NEUROLOGY | Age: 64
Discharge: HOME | End: 2024-11-14
Payer: MEDICARE

## 2024-11-14 DIAGNOSIS — E78.2 MIXED HYPERLIPIDEMIA: ICD-10-CM

## 2024-11-14 DIAGNOSIS — F41.9 ANXIETY AND DEPRESSION: ICD-10-CM

## 2024-11-14 DIAGNOSIS — Z86.73 H/O: STROKE: ICD-10-CM

## 2024-11-14 DIAGNOSIS — R20.0 NUMBNESS AND TINGLING OF RIGHT UPPER AND LOWER EXTREMITY: ICD-10-CM

## 2024-11-14 DIAGNOSIS — I69.30 HISTORY OF CEREBROVASCULAR ACCIDENT (CVA) WITH RESIDUAL DEFICIT: ICD-10-CM

## 2024-11-14 DIAGNOSIS — E66.01 SEVERE OBESITY (BMI >= 40): ICD-10-CM

## 2024-11-14 DIAGNOSIS — R73.01 ELEVATED FASTING GLUCOSE: ICD-10-CM

## 2024-11-14 DIAGNOSIS — Z87.891 EX-SMOKER: ICD-10-CM

## 2024-11-14 DIAGNOSIS — G47.9 SLEEP DIFFICULTIES: ICD-10-CM

## 2024-11-14 DIAGNOSIS — Z91.89 AT HIGH RISK FOR STROKE: ICD-10-CM

## 2024-11-14 DIAGNOSIS — R20.2 NUMBNESS AND TINGLING OF RIGHT UPPER AND LOWER EXTREMITY: ICD-10-CM

## 2024-11-14 DIAGNOSIS — Z80.3 FAMILY HISTORY OF BREAST CANCER: ICD-10-CM

## 2024-11-14 DIAGNOSIS — G47.33 OSA (OBSTRUCTIVE SLEEP APNEA): ICD-10-CM

## 2024-11-14 DIAGNOSIS — G81.91 RIGHT HEMIPLEGIA (HCC): ICD-10-CM

## 2024-11-14 DIAGNOSIS — I63.9 LEFT PONTINE STROKE (HCC): ICD-10-CM

## 2024-11-14 DIAGNOSIS — F32.A ANXIETY AND DEPRESSION: ICD-10-CM

## 2024-11-14 DIAGNOSIS — I67.82 CHRONIC CEREBRAL ISCHEMIA: ICD-10-CM

## 2024-11-14 DIAGNOSIS — I10 PRIMARY HYPERTENSION: ICD-10-CM

## 2024-11-14 PROCEDURE — 95813 EEG EXTND MNTR 61-119 MIN: CPT

## 2024-11-14 NOTE — PROGRESS NOTES
EEG / EXTENDED EEG Completed with [facility].    PCP: Deneen Garcia, TREY - CNP    Ordering: Garrett Montejo Neurologist    Interpreting Physician: Gustabo Mckinley Neurologist    Technician: Love GARLAND/HUMPHREY

## 2024-11-20 ENCOUNTER — HOSPITAL ENCOUNTER (OUTPATIENT)
Dept: CT IMAGING | Age: 64
Discharge: HOME | End: 2024-11-22
Payer: MEDICARE

## 2024-11-20 ENCOUNTER — HOSPITAL ENCOUNTER (OUTPATIENT)
Dept: INTERVENTIONAL RADIOLOGY/VASCULAR | Age: 64
Discharge: HOME | End: 2024-11-22
Payer: MEDICARE

## 2024-11-20 DIAGNOSIS — E78.2 MIXED HYPERLIPIDEMIA: ICD-10-CM

## 2024-11-20 DIAGNOSIS — Z80.3 FAMILY HISTORY OF BREAST CANCER: ICD-10-CM

## 2024-11-20 DIAGNOSIS — Z91.89 AT HIGH RISK FOR STROKE: ICD-10-CM

## 2024-11-20 DIAGNOSIS — R73.01 ELEVATED FASTING GLUCOSE: ICD-10-CM

## 2024-11-20 DIAGNOSIS — G47.9 SLEEP DIFFICULTIES: ICD-10-CM

## 2024-11-20 DIAGNOSIS — I67.82 CHRONIC CEREBRAL ISCHEMIA: ICD-10-CM

## 2024-11-20 DIAGNOSIS — I69.30 HISTORY OF CEREBROVASCULAR ACCIDENT (CVA) WITH RESIDUAL DEFICIT: ICD-10-CM

## 2024-11-20 DIAGNOSIS — E66.01 SEVERE OBESITY (BMI >= 40): ICD-10-CM

## 2024-11-20 DIAGNOSIS — F41.9 ANXIETY AND DEPRESSION: ICD-10-CM

## 2024-11-20 DIAGNOSIS — R20.0 NUMBNESS AND TINGLING OF RIGHT UPPER AND LOWER EXTREMITY: ICD-10-CM

## 2024-11-20 DIAGNOSIS — Z87.891 EX-SMOKER: ICD-10-CM

## 2024-11-20 DIAGNOSIS — I10 PRIMARY HYPERTENSION: ICD-10-CM

## 2024-11-20 DIAGNOSIS — F32.A ANXIETY AND DEPRESSION: ICD-10-CM

## 2024-11-20 DIAGNOSIS — G47.33 OSA (OBSTRUCTIVE SLEEP APNEA): ICD-10-CM

## 2024-11-20 DIAGNOSIS — G81.91 RIGHT HEMIPLEGIA (HCC): ICD-10-CM

## 2024-11-20 DIAGNOSIS — I63.9 LEFT PONTINE STROKE (HCC): ICD-10-CM

## 2024-11-20 DIAGNOSIS — Z86.73 H/O: STROKE: ICD-10-CM

## 2024-11-20 DIAGNOSIS — R20.2 NUMBNESS AND TINGLING OF RIGHT UPPER AND LOWER EXTREMITY: ICD-10-CM

## 2024-11-20 LAB
VAS LEFT CCA DIST EDV: 20.4 CM/S
VAS LEFT CCA DIST PSV: 76.9 CM/S
VAS LEFT CCA MID EDV: 20.35 CM/S
VAS LEFT CCA MID PSV: 80.12 CM/S
VAS LEFT CCA PROX EDV: 20.4 CM/S
VAS LEFT CCA PROX PSV: 106 CM/S
VAS LEFT ECA EDV: 4.2 CM/S
VAS LEFT ECA PSV: 62.4 CM/S
VAS LEFT ICA DIST EDV: 25.2 CM/S
VAS LEFT ICA DIST PSV: 68.8 CM/S
VAS LEFT ICA MID EDV: 22 CM/S
VAS LEFT ICA MID PSV: 64 CM/S
VAS LEFT ICA PROX EDV: 15.5 CM/S
VAS LEFT ICA PROX PSV: 73.7 CM/S
VAS LEFT ICA/CCA PSV: 0.96 NO UNITS
VAS LEFT VERTEBRAL EDV: 8.9 CM/S
VAS LEFT VERTEBRAL PSV: 29 CM/S
VAS RIGHT CCA DIST EDV: 15.5 CM/S
VAS RIGHT CCA DIST PSV: 70.4 CM/S
VAS RIGHT CCA MID EDV: 13.89 CM/S
VAS RIGHT CCA MID PSV: 63.96 CM/S
VAS RIGHT CCA PROX EDV: 18.7 CM/S
VAS RIGHT CCA PROX PSV: 112.4 CM/S
VAS RIGHT ECA EDV: 0 CM/S
VAS RIGHT ECA PSV: 73.3 CM/S
VAS RIGHT ICA DIST EDV: 35.5 CM/S
VAS RIGHT ICA DIST PSV: 75.1 CM/S
VAS RIGHT ICA MID EDV: 26.7 CM/S
VAS RIGHT ICA MID PSV: 68.5 CM/S
VAS RIGHT ICA PROX EDV: 10.6 CM/S
VAS RIGHT ICA PROX PSV: 36.2 CM/S
VAS RIGHT ICA/CCA PSV: 1.2 NO UNITS
VAS RIGHT VERTEBRAL EDV: 10.11 CM/S
VAS RIGHT VERTEBRAL PSV: 32.2 CM/S

## 2024-11-20 PROCEDURE — 93880 EXTRACRANIAL BILAT STUDY: CPT | Performed by: STUDENT IN AN ORGANIZED HEALTH CARE EDUCATION/TRAINING PROGRAM

## 2024-11-20 PROCEDURE — 70450 CT HEAD/BRAIN W/O DYE: CPT

## 2024-11-20 PROCEDURE — 72125 CT NECK SPINE W/O DYE: CPT

## 2024-11-20 PROCEDURE — 93880 EXTRACRANIAL BILAT STUDY: CPT

## 2024-11-25 ENCOUNTER — HOSPITAL ENCOUNTER (OUTPATIENT)
Age: 64
Setting detail: SPECIMEN
Discharge: HOME | End: 2024-11-25
Payer: MEDICARE

## 2024-11-25 LAB
CHOLEST SERPL-MCNC: 95 MG/DL (ref 0–199)
CHOLESTEROL/HDL RATIO: 2.4
HDLC SERPL-MCNC: 39 MG/DL
LDLC SERPL CALC-MCNC: 29 MG/DL (ref 0–100)
TRIGL SERPL-MCNC: 134 MG/DL
VLDLC SERPL CALC-MCNC: 27 MG/DL (ref 1–30)

## 2024-11-25 PROCEDURE — 36415 COLL VENOUS BLD VENIPUNCTURE: CPT

## 2024-11-25 PROCEDURE — 80061 LIPID PANEL: CPT

## 2024-11-26 ENCOUNTER — TELEPHONE (OUTPATIENT)
Dept: NEUROLOGY | Age: 64
End: 2024-11-26

## 2024-11-26 NOTE — TELEPHONE ENCOUNTER
Last Appt:  11/7/2024  Next Appt:   1/16/2025  Med verified in Epic     Patient was seen on 11/7/24 requesting surgical clearance for left total hip replacement. Patient completed neuro testing (CT brain, CT cervical spine, Carotid doppler and labs) TCD is scheduled for 12/9/24.     Nurse Maria Del Carmen from the [facility] calling for test results and surgical clearance. Facility would like to get this surgery scheduled soon as patient is having a lot of pain in her left hip.     Please advise

## 2024-11-26 NOTE — TELEPHONE ENCOUNTER
SILVIANO  AND   RECENT  WORK UP  IN NOV. 2024   REVIEWED.      PATIENT  CLEARED  NEUROLOGICALLY.         TO  GET   ADDITIONAL  CLEARANCE  FROM  HER  PCP   AND OTHER  CONSULTANTS.       THANK  YOU

## (undated) DEVICE — POUCH INSTR W40XL26IN BUTT FLD COLL FLTR DRNGE PRT

## (undated) DEVICE — SOLUTION SCRB 4OZ 4% CHG CLN BASE FOR PT SKIN ANTISEPSIS

## (undated) DEVICE — GLOVE ORANGE PI 7 1/2   MSG9075

## (undated) DEVICE — PACK,CYSTOSCOPY,PK VI: Brand: MEDLINE

## (undated) DEVICE — GOWN,AURORA,NONREINFORCED,LARGE: Brand: MEDLINE

## (undated) DEVICE — Z INACTIVE USE 2660663 SOLUTION IRRIG 1000ML STRIL H2O USP PLAS POUR BTL

## (undated) DEVICE — GOWN,AURORA,NONRNF,XL,30/CS: Brand: MEDLINE

## (undated) DEVICE — CYSTO/BLADDER IRRIGATION SET, REGULATING CLAMP

## (undated) DEVICE — SOLUTION IV 1000ML 0.9% SOD CHL FOR IRRIG PLAS CONT

## (undated) DEVICE — GARMENT,MEDLINE,DVT,INT,CALF,LG, GEN2: Brand: MEDLINE

## (undated) DEVICE — 4-PORT MANIFOLD: Brand: NEPTUNE 2